# Patient Record
Sex: MALE | Race: WHITE | Employment: OTHER | URBAN - METROPOLITAN AREA
[De-identification: names, ages, dates, MRNs, and addresses within clinical notes are randomized per-mention and may not be internally consistent; named-entity substitution may affect disease eponyms.]

---

## 2019-01-24 ENCOUNTER — OFFICE VISIT (OUTPATIENT)
Dept: ORTHOPEDIC SURGERY | Age: 64
End: 2019-01-24
Payer: MEDICARE

## 2019-01-24 VITALS
HEIGHT: 69 IN | BODY MASS INDEX: 37.03 KG/M2 | HEART RATE: 54 BPM | WEIGHT: 250 LBS | SYSTOLIC BLOOD PRESSURE: 140 MMHG | DIASTOLIC BLOOD PRESSURE: 89 MMHG

## 2019-01-24 DIAGNOSIS — M75.121 COMPLETE TEAR OF RIGHT ROTATOR CUFF: Primary | ICD-10-CM

## 2019-01-24 DIAGNOSIS — M25.511 RIGHT SHOULDER PAIN, UNSPECIFIED CHRONICITY: ICD-10-CM

## 2019-01-24 DIAGNOSIS — M19.011 ARTHRITIS OF RIGHT ACROMIOCLAVICULAR JOINT: ICD-10-CM

## 2019-01-24 PROCEDURE — 99203 OFFICE O/P NEW LOW 30 MIN: CPT | Performed by: ORTHOPAEDIC SURGERY

## 2019-01-24 RX ORDER — PREDNISONE 1 MG/1
5 TABLET ORAL
Refills: 0 | COMMUNITY
Start: 2018-11-01 | End: 2019-02-21

## 2019-01-24 RX ORDER — HYDROCODONE BITARTRATE 30 MG/1
30 TABLET, EXTENDED RELEASE ORAL
COMMUNITY
Start: 2019-01-22

## 2019-01-24 RX ORDER — GABAPENTIN 100 MG/1
CAPSULE ORAL
Refills: 0 | COMMUNITY
Start: 2018-11-28 | End: 2019-08-06

## 2019-01-24 RX ORDER — WARFARIN SODIUM 5 MG/1
TABLET ORAL
COMMUNITY
Start: 2018-08-15 | End: 2022-01-13

## 2019-01-24 RX ORDER — AMLODIPINE BESYLATE 5 MG/1
5 TABLET ORAL
COMMUNITY
Start: 2017-09-21 | End: 2022-01-13

## 2019-01-24 RX ORDER — HYDROCHLOROTHIAZIDE 12.5 MG/1
12.5 CAPSULE, GELATIN COATED ORAL
Refills: 2 | COMMUNITY
Start: 2018-12-21 | End: 2022-01-13

## 2019-01-24 RX ORDER — CITALOPRAM 20 MG/1
20 TABLET ORAL
Refills: 3 | COMMUNITY
Start: 2019-01-05 | End: 2022-01-13

## 2019-01-24 ASSESSMENT — ENCOUNTER SYMPTOMS
SINUS PAIN: 1
SORE THROAT: 1

## 2019-02-21 ENCOUNTER — OFFICE VISIT (OUTPATIENT)
Dept: ORTHOPEDIC SURGERY | Age: 64
End: 2019-02-21
Payer: MEDICARE

## 2019-02-21 VITALS
DIASTOLIC BLOOD PRESSURE: 90 MMHG | WEIGHT: 250 LBS | HEART RATE: 60 BPM | BODY MASS INDEX: 37.03 KG/M2 | HEIGHT: 69 IN | SYSTOLIC BLOOD PRESSURE: 147 MMHG

## 2019-02-21 DIAGNOSIS — M75.121 COMPLETE TEAR OF RIGHT ROTATOR CUFF: ICD-10-CM

## 2019-02-21 DIAGNOSIS — M19.011 ARTHRITIS OF RIGHT ACROMIOCLAVICULAR JOINT: Primary | ICD-10-CM

## 2019-02-21 DIAGNOSIS — M25.511 RIGHT SHOULDER PAIN, UNSPECIFIED CHRONICITY: ICD-10-CM

## 2019-02-21 PROCEDURE — 3017F COLORECTAL CA SCREEN DOC REV: CPT | Performed by: ORTHOPAEDIC SURGERY

## 2019-02-21 PROCEDURE — G8417 CALC BMI ABV UP PARAM F/U: HCPCS | Performed by: ORTHOPAEDIC SURGERY

## 2019-02-21 PROCEDURE — G8484 FLU IMMUNIZE NO ADMIN: HCPCS | Performed by: ORTHOPAEDIC SURGERY

## 2019-02-21 PROCEDURE — G8427 DOCREV CUR MEDS BY ELIG CLIN: HCPCS | Performed by: ORTHOPAEDIC SURGERY

## 2019-02-21 PROCEDURE — 4004F PT TOBACCO SCREEN RCVD TLK: CPT | Performed by: ORTHOPAEDIC SURGERY

## 2019-02-21 PROCEDURE — L3670 SO ACRO/CLAV CAN WEB PRE OTS: HCPCS | Performed by: ORTHOPAEDIC SURGERY

## 2019-02-21 PROCEDURE — 99213 OFFICE O/P EST LOW 20 MIN: CPT | Performed by: ORTHOPAEDIC SURGERY

## 2019-03-01 ENCOUNTER — TELEPHONE (OUTPATIENT)
Dept: ORTHOPEDIC SURGERY | Age: 64
End: 2019-03-01

## 2019-03-07 ENCOUNTER — TELEPHONE (OUTPATIENT)
Dept: ORTHOPEDIC SURGERY | Age: 64
End: 2019-03-07

## 2019-03-07 DIAGNOSIS — Z98.890 S/P SHOULDER SURGERY: Primary | ICD-10-CM

## 2019-03-07 RX ORDER — OXYCODONE HYDROCHLORIDE AND ACETAMINOPHEN 5; 325 MG/1; MG/1
1 TABLET ORAL EVERY 6 HOURS PRN
Qty: 28 TABLET | Refills: 0 | Status: CANCELLED | OUTPATIENT
Start: 2019-03-07 | End: 2019-03-14

## 2019-03-07 RX ORDER — OXYCODONE HYDROCHLORIDE AND ACETAMINOPHEN 5; 325 MG/1; MG/1
1 TABLET ORAL EVERY 6 HOURS PRN
Qty: 28 TABLET | Refills: 0 | Status: SHIPPED | OUTPATIENT
Start: 2019-03-07 | End: 2019-03-14

## 2019-03-14 ENCOUNTER — OFFICE VISIT (OUTPATIENT)
Dept: ORTHOPEDIC SURGERY | Age: 64
End: 2019-03-14

## 2019-03-14 VITALS
DIASTOLIC BLOOD PRESSURE: 78 MMHG | HEIGHT: 69 IN | HEART RATE: 60 BPM | SYSTOLIC BLOOD PRESSURE: 144 MMHG | BODY MASS INDEX: 37.03 KG/M2 | WEIGHT: 250 LBS

## 2019-03-14 DIAGNOSIS — Z98.890 S/P SHOULDER SURGERY: Primary | ICD-10-CM

## 2019-03-14 DIAGNOSIS — M75.121 COMPLETE TEAR OF RIGHT ROTATOR CUFF: ICD-10-CM

## 2019-03-14 DIAGNOSIS — M19.011 ARTHRITIS OF RIGHT ACROMIOCLAVICULAR JOINT: ICD-10-CM

## 2019-03-14 DIAGNOSIS — Z98.890 S/P ROTATOR CUFF REPAIR: ICD-10-CM

## 2019-03-14 PROCEDURE — 99024 POSTOP FOLLOW-UP VISIT: CPT | Performed by: PHYSICIAN ASSISTANT

## 2019-03-21 DIAGNOSIS — Z98.890 S/P SHOULDER SURGERY: Primary | ICD-10-CM

## 2019-03-21 RX ORDER — OXYCODONE HYDROCHLORIDE AND ACETAMINOPHEN 5; 325 MG/1; MG/1
1 TABLET ORAL EVERY 6 HOURS PRN
Qty: 28 TABLET | Refills: 0 | Status: SHIPPED | OUTPATIENT
Start: 2019-03-21 | End: 2019-03-28

## 2019-03-28 ENCOUNTER — OFFICE VISIT (OUTPATIENT)
Dept: ORTHOPEDIC SURGERY | Age: 64
End: 2019-03-28

## 2019-03-28 VITALS
BODY MASS INDEX: 37.03 KG/M2 | HEIGHT: 69 IN | WEIGHT: 250 LBS | HEART RATE: 81 BPM | DIASTOLIC BLOOD PRESSURE: 88 MMHG | SYSTOLIC BLOOD PRESSURE: 125 MMHG

## 2019-03-28 DIAGNOSIS — Z98.890 S/P SHOULDER SURGERY: Primary | ICD-10-CM

## 2019-03-28 DIAGNOSIS — Z98.890 S/P RIGHT ROTATOR CUFF REPAIR: ICD-10-CM

## 2019-03-28 PROCEDURE — 99024 POSTOP FOLLOW-UP VISIT: CPT | Performed by: ORTHOPAEDIC SURGERY

## 2019-03-28 RX ORDER — NALOXONE HYDROCHLORIDE 4 MG/.1ML
1 SPRAY NASAL PRN
Qty: 1 EACH | Refills: 5 | Status: SHIPPED | OUTPATIENT
Start: 2019-03-28

## 2019-03-28 RX ORDER — HYDROCODONE BITARTRATE AND ACETAMINOPHEN 5; 325 MG/1; MG/1
1 TABLET ORAL EVERY 6 HOURS PRN
Qty: 28 TABLET | Refills: 0 | Status: SHIPPED | OUTPATIENT
Start: 2019-03-28 | End: 2019-04-04

## 2019-03-29 ENCOUNTER — EVALUATION (OUTPATIENT)
Dept: PHYSICAL THERAPY | Age: 64
End: 2019-03-29
Payer: MEDICARE

## 2019-03-29 DIAGNOSIS — G89.29 CHRONIC RIGHT SHOULDER PAIN: ICD-10-CM

## 2019-03-29 DIAGNOSIS — M25.511 CHRONIC RIGHT SHOULDER PAIN: ICD-10-CM

## 2019-03-29 DIAGNOSIS — M75.121 COMPLETE TEAR OF RIGHT ROTATOR CUFF: Primary | ICD-10-CM

## 2019-03-29 PROCEDURE — 97161 PT EVAL LOW COMPLEX 20 MIN: CPT | Performed by: PHYSICAL THERAPIST

## 2019-03-29 PROCEDURE — 97110 THERAPEUTIC EXERCISES: CPT | Performed by: PHYSICAL THERAPIST

## 2019-03-29 PROCEDURE — 97530 THERAPEUTIC ACTIVITIES: CPT | Performed by: PHYSICAL THERAPIST

## 2019-04-05 ENCOUNTER — TREATMENT (OUTPATIENT)
Dept: PHYSICAL THERAPY | Age: 64
End: 2019-04-05
Payer: MEDICARE

## 2019-04-05 DIAGNOSIS — G89.29 CHRONIC RIGHT SHOULDER PAIN: ICD-10-CM

## 2019-04-05 DIAGNOSIS — M75.121 COMPLETE TEAR OF RIGHT ROTATOR CUFF: Primary | ICD-10-CM

## 2019-04-05 DIAGNOSIS — M25.511 CHRONIC RIGHT SHOULDER PAIN: ICD-10-CM

## 2019-04-05 PROCEDURE — 97530 THERAPEUTIC ACTIVITIES: CPT | Performed by: PHYSICAL THERAPIST

## 2019-04-05 PROCEDURE — 97110 THERAPEUTIC EXERCISES: CPT | Performed by: PHYSICAL THERAPIST

## 2019-04-05 NOTE — FLOWSHEET NOTE
restriction. 5. Pt will be able to lift R UE for function w/ deviations. (patient specific functional goal)       Progression Towards Functional goals:  [] Patient is progressing as expected towards functional goals listed. [] Progression is slowed due to complexities listed. [] Progression has been slowed due to co-morbidities. [x] Plan just implemented, too soon to assess goals progression  [] Other:     ASSESSMENT:  Pt needs cuing on precautions and restrictions. He agreed to put sling back on when he gets home. Pt is having min to no pain in shoulder. Scapular control fair and needed cuing. Treatment/Activity Tolerance:  [x] Patient tolerated treatment well [] Patient limited by fatique  [] Patient limited by pain  [] Patient limited by other medical complications  [] Other:     Prognosis: [x] Good [] Fair  [] Poor    Patient Requires Follow-up: [x] Yes  [] No    PLAN: Cont to monitor that pt is following protocol. Cont therapy after massive RTC repair.   [x] Continue per plan of care [] Alter current plan (see comments)  [x] Plan of care initiated [] Hold pending MD visit [] Discharge    Electronically signed by: Marisela Velarde PT, ILYA Valdez    Physical Therapist [place] license #[id number]  Physical Therapist [place] license #[id number]

## 2019-04-17 NOTE — PROGRESS NOTES
Review of Systems   Musculoskeletal:        Joint pain         No change since last updated
large rotator cuff tear along with distal clavicle excision and biceps tenodesis. Surgery date was 3/4/2019        Impression:  Encounter Diagnoses   Name Primary?  S/P shoulder surgery Yes    Arthritis of right acromioclavicular joint     Complete tear of right rotator cuff     S/P rotator cuff repair        Office Procedures:  No orders of the defined types were placed in this encounter. Treatment Plan:    Overall the patient is doing well. The pain is well-controlled. We recommend that he wear the UltraSling brace at all times with the exception of clothing, bathing of physical therapy. The patient was told that he is restricted from driving for at least 3 weeks postop. We will give a print out of their physical therapy order. All of [facility] questions were fully answered today. We would like to see im back in 2 weeks for follow-up visit. 12:52 PM      Cary Hartley PA-C  Orthopaedic Sports Medicine Physician Assistant      This dictation was performed with a verbal recognition program [facility]) and it was checked for errors. It is possible that there are still dictated errors within this office note. If so, please bring any errors to my attention for an addendum. All efforts were made to ensure that this office note is accurate.

## 2019-04-30 ENCOUNTER — OFFICE VISIT (OUTPATIENT)
Dept: ORTHOPEDIC SURGERY | Age: 64
End: 2019-04-30

## 2019-04-30 VITALS
HEIGHT: 69 IN | WEIGHT: 250 LBS | BODY MASS INDEX: 37.03 KG/M2 | HEART RATE: 72 BPM | SYSTOLIC BLOOD PRESSURE: 146 MMHG | DIASTOLIC BLOOD PRESSURE: 88 MMHG

## 2019-04-30 DIAGNOSIS — Z98.890 S/P SHOULDER SURGERY: Primary | ICD-10-CM

## 2019-04-30 DIAGNOSIS — Z98.890 S/P RIGHT ROTATOR CUFF REPAIR: ICD-10-CM

## 2019-04-30 PROCEDURE — 99024 POSTOP FOLLOW-UP VISIT: CPT | Performed by: ORTHOPAEDIC SURGERY

## 2019-04-30 NOTE — PROGRESS NOTES
medications, allergies, past medical, surgical, social and family histories were reviewed and updated as appropriate. Allergies: Allergies   Allergen Reactions    Oxycodone Nausea And Vomiting       Physical Exam:  Vitals:    04/30/19 1351   BP: (!) 146/88   Pulse: 72     General: Modesto Escobedo is a healthy and well appearing 71 y.o. male who is sitting comfortably in our office in acute distress. Alert and oriented. General/Appearance: Alert and oriented and in no apparent distress. Skin:  There are no skin lesions, cellulitis, or extreme edema. The patient has warm and well-perfused Bilateral upper extremities with brisk capillary refill. Right Shoulder Exam:  Inspection: Right shoulder incisions are clean, dry, and well-healing. No edema, erythema or ecchymosis. No gross deformities, no signs of infection. Palpation: Mild tenderness to palpation over rotator cuff footprint. No pain over posterior osteophyte. No crepitus. Active Range of Motion: Forward elevation of 150, abduction of 130, external rotation with elbow at the side 40, internal rotation to the back is to back pocket    Passive Range of Motion: Passively forward elevation can be further increased to 170. Strength: Deferred. Special Tests: Deferred. Neurovascular: Sensation to light touch is intact, no motor deficits, palpable radial pulses 2+    Neuro: alert. oriented  Eyes: Extra-ocular muscles intact  Mouth: Oral mucosa moist. No perioral lesions  Pulm: Respirations unlabored and regular. Skin: warm, well perfused    Additional Examinations:    Examination of the contralateral extremity does not show any tenderness, deformity or injury. Range of motion is unremarkable. There is no gross instability. There are no rashes, ulcerations or lesions. Strength and tone are normal.      Radiographic:  None performed.     Assessment:  Modesto Escobedo is a 71y.o. year old male with right shoulder pain related to right shoulder arthroscopy debridement decompression, massive rotator cuff repair, lysis of adhesions, and distal clavicle excision performed 8 weeks ago on 3/4/19. Impression:  Encounter Diagnoses   Name Primary?  S/P shoulder surgery Yes    S/P right rotator cuff repair        Office Procedures:  Orders Placed This Encounter   Procedures    Ambulatory referral to Physical Therapy     Referral Priority:   Routine     Referral Type:   Eval and Treat     Referral Reason:   Specialty Services Required     Requested Specialty:   Physical Therapy     Number of Visits Requested:   1       Plan:   Mr. Sherlyn Nino is now 8 weeks s/p  right shoulder arthroscopy debridement decompression, massive rotator cuff repair, lysis of adhesions, and distal clavicle excision. He is doing well overall. He was told to discontinue use of his UltraSling. A physical therapy order was written ) I was Wilmar García to the patient today. We will have him start some light strengthening with biceps, triceps and rowing exercises. We will have him continue to work on his range of motion along with external rotation and internal rotation. Details of the therapy was placed in his epic chart. All questions were answered and the patient was agreeable to this plan. He was told to follow up with us in the office again in 3 weeks. 4/30/2019  3:55 PM      Roberth Cavazos PA-C  Orthopaedic Sports Medicine Physician Assistant    During this examination, IRoberth PA-C, functioned as a scribe for Dr. Ariadna Mercado. This dictation was performed with a verbal recognition program ([employer]) and it was checked for errors. It is possible that there are still dictated errors within this office note. If so, please bring any errors to my attention for an addendum.   All efforts were made to ensure that this office note is accurate.  _________________  I, Dr. Ariadna Mercado, personally performed the services described in this documentation as described by [facility] Celsa Templeton PA-C in my presence, and it is both accurate and complete. Murtaza Hutchison MD, PhD  4/30/2019

## 2019-04-30 NOTE — LETTER
Physical Therapy Rehabilitation Referral    Patient Name:  Vy Grant      YOB: 1955    Diagnosis:    1. S/P shoulder surgery    2. S/P right rotator cuff repair        Precautions:     [x] Evaluate and Treat    Post Op Instructions:  [] Continuous passive motion (CPM) [x] Elbow ROM  [x] Exercise in plane of scapula  []  Strengthening     [] Pulley and instruction   [x] Home exercise program (copy to patient)   [] Sling when arm at risk  [] Sling or brace at all times   [x] AAROM: Forward elevation to  140            [x] AAROM: External rotation  To  40    [] Isometric external rotator strengthening [x] AAROM: internal rotation: up the back  [x] Isometric abductor strengthening  [x] AAROM: Internal abduction   [] Isometric internal rotator strengthening [] AAROM: cross-body adduction             Stretching:     Strengthening:  [] Four quadrant (FE, ER, IR, CBA)  [] Rotator cuff (ER, IR, Abd)  [x] Forward Elevation    [] External Rotators     [x] External Rotation    [] Internal Rotators  [x] Internal Rotation: up/back   [] Abductors     [x] Internal Rotation: supine in abduction  [] Sleeper Stretch    [] Flexors  [] Cross-body abduction    [] Extensors  [x] Pendulum (FE, Abd/Add, cw/ccw)  [x] Scapular Stabilizers   [x] Wall-walking (FE, Abd)        [x] Shoulder shrugs     [x] Table slides (FE)                [x] Rhomboid pinch  [] Elbow (flex, ext, pron, sup)        [] Lat.  Pull downs     [] Medial epicondylitis program       [] Forward punch   [] Lateral epicondylitis program       [] Internal rotators     [] Progressive resistive exercises  [] Bench Press        [] Bench press plus  Activities:     [] Lateral pull-downs  [] Rowing     [] Progressive two-hand supine press  [] Stepper/Exercise bike   [x] Biceps: curls/supination  [] Swimming  [] Water exercises    Modalities:     Return to Sport:  [x] Of Choice      [] Plyometrics  [] Ultrasound     [] Rhythmic stabilization

## 2019-05-03 ENCOUNTER — TREATMENT (OUTPATIENT)
Dept: PHYSICAL THERAPY | Age: 64
End: 2019-05-03
Payer: MEDICARE

## 2019-05-03 DIAGNOSIS — G89.29 CHRONIC RIGHT SHOULDER PAIN: ICD-10-CM

## 2019-05-03 DIAGNOSIS — M75.121 COMPLETE TEAR OF RIGHT ROTATOR CUFF: Primary | ICD-10-CM

## 2019-05-03 DIAGNOSIS — M25.511 CHRONIC RIGHT SHOULDER PAIN: ICD-10-CM

## 2019-05-03 PROCEDURE — 97530 THERAPEUTIC ACTIVITIES: CPT | Performed by: PHYSICAL THERAPIST

## 2019-05-03 PROCEDURE — 97110 THERAPEUTIC EXERCISES: CPT | Performed by: PHYSICAL THERAPIST

## 2019-05-03 NOTE — PLAN OF CARE
[facility]   Phone: [phone]    Fax: [phone]        Physical Therapy Daily Treatment Note/POC   Physical Therapy Re-Certification Plan of Care    Dear  Dr. Lord Herring,    We had the pleasure of treating the following patient for physical therapy services at [address] [place] [address]. A summary of our findings can be found in the updated assessment below. This includes our plan of care. If you have any questions or concerns regarding these findings, please do not hesitate to contact me at the office phone number checked above. Thank you for the referral.     Physician Signature:________________________________Date:__________________  By signing above (or electronic signature), therapists plan is approved by physician      Overall Response to Treatment:   []Patient is responding well to treatment and improvement is noted with regards  to goals   []Patient should continue to improve in reasonable time if they continue HEP   []Patient has plateaued and is no longer responding to skilled PT intervention    []Patient is getting worse and would benefit from return to referring MD   []Patient unable to adhere to initial POC   []Other: Pt has not been coming to therapy. He has been to therapy for eval and 1 session. Pt has been using his R shoulder a lot at home but trying not to do so.  Pain is min in shoulder and sleeping    Date:  5/3/2019    Patient Name:  Vin Wilcox    :  1955  MRN: [id number]  Medical/Treatment Diagnosis Information:  · Diagnosis: R massive RTC, SAD, Clementine on 51     Insurance/Certification information:  PT Insurance Information: [employer] $10 copayment; med nec  Physician Information:  Referring Practitioner: Nitza Easley of care signed (Y/N): signed 3/29/19,    Date of Patient follow up with Physician:     G-Code (if applicable):      Date G-Code Applied: 3/29/19    PT G-Codes  Functional Assessment Tool Used: UEFI  Score: 1080=8%  Functional Limitation: Carrying, moving and handling objects    Progress Note: [x]  Yes  []  No  Next due by: 6/3/19      Latex Allergy:  [x]NO      []YES  Preferred Language for Healthcare:   [x]English       []other:    Visit # Insurance Allowable   3 Med nec     Pain level:  9/10 worst in last week for R shoulder; 4/10 constant in shoulder;     SUBJECTIVE: Pt has min pain in shoulder in ant shoulder region. He has been sleeping w/ min to no pain in shoulder. He did swing a piece of wood at dog that had bitten his grand kid w/ pain afterwards. Icing sporadically;    OBJECTIVE: See eval  Observation: sling d/c;  Test measurements:      RESTRICTIONS/PRECAUTIONS: stroke; Exercises/Interventions:   Exercises:  Exercise/Equipment Resistance/Repetitions Comments   Cardio/Warm-up     Bike          Stretching/PROM     Wand ER @0 10\"x10    Table Slides flex and scaption 10\"x10    UE [place]     Pulleys     Pendulum      IR using towel to back 10\"x10         STRENGTH     Isometrics     Retraction          Weight shift     Flexion     Abduction 10\"x10 Muscle activation   External Rotation     Internal Rotation     Biceps 0# x30    Triceps          PRE's     Flexion     Abduction     External Rotation     Internal Rotation     Shrugs 2# x20 cuing   EXT     Reverse Flys     Serratus     Horizontal Abd with ER     Biceps     Triceps OTB x30    Retraction 9 :00 position in sitting x20 cuing   Wrist flex and ext          Cable Column/Theraband     External Rotation     Internal Rotation     Shrugs     Lats     Ext     Flex     Scapular Retraction     BIC     TRIC     PNF          Neuromuscular Re-ed     Dynamic Stability          Fitting sling     Ed on POC, expectation & precautions x10 min, 5/3/19 reviewed and ed pt on healing process & precautions NEEDS REVIEWING             Plyoback                    Manual/Modalities                     PQRS:   Reviewed medication list with patient. No changes since last PT visit. reaching, carrying, lifting, house/yardwork, driving/computer work    Modalities:  declined    Charges:  Timed Code Treatment Minutes: 30   Total Treatment Minutes: 60     [] EVAL (LOW) 77171 (typically 20 minutes face-to-face)  [] EVAL (MOD) 81112 (typically 30 minutes face-to-face)  [] EVAL (HIGH) 98170 (typically 45 minutes face-to-face)  [] RE-EVAL   [x] AS(11725) x  2   [] IONTO  [] NMR (77072) x      [] VASO  [] Manual (81737) x       [] Other:  [x] TA (00874) x  1    [] Mech Traction (95997)  [] ES(attended) (10518)      [] ES (un) (39952):     GOALS:  Patient stated goal: full motion of R shoulder    Therapist goals for Patient:   Short Term Goals: To be achieved in: 2 weeks  1. Independent in HEP and progression per patient tolerance, in order to prevent re-injury. 2. Patient will have a decrease in pain to facilitate improvement in movement, function, and ADLs as indicated by Functional Deficits. Long Term Goals: To be achieved in: 12 weeks  1. Disability index score of 10% or less for the DASH to assist with reaching prior level of function. 2. Patient will demonstrate increased AROM to 165 elevation,  head for ER, & waist for IR to allow for proper joint functioning as indicated by patients Functional Deficits. 3. Patient will demonstrate an increase in Strength to 4+/5 to allow for proper functional mobility as indicated by patients Functional Deficits. 4. Patient will return to all functional activities without increased symptoms or restriction. 5. Pt will be able to lift R UE for function w/ deviations. (patient specific functional goal)       Progression Towards Functional goals:  [] Patient is progressing as expected towards functional goals listed. [] Progression is slowed due to complexities listed. [] Progression has been slowed due to co-morbidities.   [x] Plan just implemented, too soon to assess goals progression  [] Other:     ASSESSMENT:  Pt needs cuing on precautions and restrictions. Pt is doing too much at home so reviewed precautions and healing time. Motion in shoulder is really good and min pain in shoulder. Advanced HEP to incorporate stretching into HEP      Treatment/Activity Tolerance:  [x] Patient tolerated treatment well [] Patient limited by fatique  [] Patient limited by pain  [] Patient limited by other medical complications  [] Other:     Prognosis: [x] Good [] Fair  [] Poor    Patient Requires Follow-up: [x] Yes  [] No    PLAN: Cont to monitor that pt is following protocol. Cont therapy after massive RTC repair.   [x] Continue per plan of care [] Alter current plan (see comments)  [x] Plan of care initiated [] Hold pending MD visit [] Discharge    Electronically signed by: Zenon Neri PT, Susan Sanford 86, OMT-C    Physical Therapist [place] license #[id number]  Physical Therapist [place] license #[id number]

## 2019-05-10 ENCOUNTER — TREATMENT (OUTPATIENT)
Dept: PHYSICAL THERAPY | Age: 64
End: 2019-05-10
Payer: MEDICARE

## 2019-05-10 DIAGNOSIS — M75.121 COMPLETE TEAR OF RIGHT ROTATOR CUFF: Primary | ICD-10-CM

## 2019-05-10 DIAGNOSIS — G89.29 CHRONIC RIGHT SHOULDER PAIN: ICD-10-CM

## 2019-05-10 DIAGNOSIS — M25.511 CHRONIC RIGHT SHOULDER PAIN: ICD-10-CM

## 2019-05-10 PROCEDURE — 97530 THERAPEUTIC ACTIVITIES: CPT | Performed by: PHYSICAL THERAPIST

## 2019-05-10 PROCEDURE — 97110 THERAPEUTIC EXERCISES: CPT | Performed by: PHYSICAL THERAPIST

## 2019-05-10 NOTE — FLOWSHEET NOTE
Internal Rotation     Biceps 1# x30    Triceps          PRE's     Flexion     Abduction     External Rotation     Internal Rotation     Shrugs 2# x20 cuing   EXT     Reverse Flys     Serratus     Horizontal Abd with ER     Biceps     Triceps OTB x30    Retraction  cuing   Wrist flex and ext          Cable Column/Theraband     External Rotation     Internal Rotation     Shrugs     Lats     Ext     Flex     Scapular Retraction YTB 3x10    BIC     TRIC     PNF          Neuromuscular Re-ed     Dynamic Stability          Fitting sling     Ed on POC, expectation & precautions x10 min, 5/10/19 reviewed and ed pt on healing process & precautions NEEDS REVIEWING             Plyoback                    Manual/Modalities                     PQRS:   Reviewed medication list with patient. No changes since last PT visit. Therapeutic Exercise and NMR EXR  [] (75370) Provided verbal/tactile cueing for activities related to strengthening, flexibility, endurance, ROM  for improvements in scapular, scapulothoracic and UE control with self care, reaching, carrying, lifting, house/yardwork, driving/computer work.    [] (44892) Provided verbal/tactile cueing for activities related to improving balance, coordination, kinesthetic sense, posture, motor skill, proprioception  to assist with  scapular, scapulothoracic and UE control with self care, reaching, carrying, lifting, house/yardwork, driving/computer work. Therapeutic Activities:    [] (54950 or 64148) Provided verbal/tactile cueing for activities related to improving balance, coordination, kinesthetic sense, posture, motor skill, proprioception and motor activation to allow for proper function of scapular, scapulothoracic and UE control with self care, carrying, lifting, driving/computer work.      Home Exercise Program:    [x] (06293) Reviewed/Progressed HEP activities related to strengthening, flexibility, endurance, ROM of scapular, scapulothoracic and UE control with self care, reaching, carrying, lifting, house/yardwork, driving/computer work 5/3/19 reviewed HEP & OTB given  [] (92371) Reviewed/Progressed HEP activities related to improving balance, coordination, kinesthetic sense, posture, motor skill, proprioception of scapular, scapulothoracic and UE control with self care, reaching, carrying, lifting, house/yardwork, driving/computer work      Manual Treatments:  PROM / STM / Oscillations-Mobs:  G-I, II, III, IV (PA's, Inf., Post.)  [] (71756) Provided manual therapy to mobilize soft tissue/joints of cervical/CT, scapular GHJ and UE for the purpose of modulating pain, promoting relaxation,  increasing ROM, reducing/eliminating soft tissue swelling/inflammation/restriction, improving soft tissue extensibility and allowing for proper ROM for normal function with self care, reaching, carrying, lifting, house/yardwork, driving/computer work    Modalities:  CP x10 min    Charges:  Timed Code Treatment Minutes: 30   Total Treatment Minutes: 60     [] EVAL (LOW) 09586 (typically 20 minutes face-to-face)  [] EVAL (MOD) 06906 (typically 30 minutes face-to-face)  [] EVAL (HIGH) 50297 (typically 45 minutes face-to-face)  [] RE-EVAL   [x] QM(09739) x  2   [] IONTO  [] NMR (40341) x      [] VASO  [] Manual (13583) x       [] Other:  [x] TA (87993) x  1    [] Mech Traction (93970)  [] ES(attended) (16187)      [] ES (un) (90351):     GOALS:  Patient stated goal: full motion of R shoulder    Therapist goals for Patient:   Short Term Goals: To be achieved in: 2 weeks  1. Independent in HEP and progression per patient tolerance, in order to prevent re-injury. 2. Patient will have a decrease in pain to facilitate improvement in movement, function, and ADLs as indicated by Functional Deficits. Long Term Goals: To be achieved in: 12 weeks  1. Disability index score of 10% or less for the DASH to assist with reaching prior level of function.    2. Patient will demonstrate increased AROM to 165 elevation,  head for ER, & waist for IR to allow for proper joint functioning as indicated by patients Functional Deficits. Progressing  3. Patient will demonstrate an increase in Strength to 4+/5 to allow for proper functional mobility as indicated by patients Functional Deficits. 4. Patient will return to all functional activities without increased symptoms or restriction. 5. Pt will be able to lift R UE for function w/ deviations. (patient specific functional goal)       Progression Towards Functional goals:  [] Patient is progressing as expected towards functional goals listed. [] Progression is slowed due to complexities listed. [] Progression has been slowed due to co-morbidities. [x] Plan just implemented, too soon to assess goals progression  [] Other:     ASSESSMENT:  Pt had more pain in shoulder today. Did not add a lot of exercises due to symptoms. Reviewed precautions. Motion in shoulder moving well for just starting activities. Good tech w/ exercises. Treatment/Activity Tolerance:  [x] Patient tolerated treatment well [] Patient limited by fatique  [] Patient limited by pain  [] Patient limited by other medical complications  [] Other:     Prognosis: [x] Good [] Fair  [] Poor    Patient Requires Follow-up: [x] Yes  [] No    PLAN: Cont to monitor that pt is following protocol. Cont therapy after massive RTC repair.   [x] Continue per plan of care [] Alter current plan (see comments)  [x] Plan of care initiated [] Hold pending MD visit [] Discharge    Electronically signed by: Michaelyn Nissen PT, Susan Sanford 86, OMT-C    Physical Therapist [place] license #[id number]  Physical Therapist [place] license #[id number]

## 2019-05-15 ENCOUNTER — TREATMENT (OUTPATIENT)
Dept: PHYSICAL THERAPY | Age: 64
End: 2019-05-15

## 2019-05-15 DIAGNOSIS — G89.29 CHRONIC RIGHT SHOULDER PAIN: ICD-10-CM

## 2019-05-15 DIAGNOSIS — M25.511 CHRONIC RIGHT SHOULDER PAIN: ICD-10-CM

## 2019-05-15 DIAGNOSIS — M75.121 COMPLETE TEAR OF RIGHT ROTATOR CUFF: Primary | ICD-10-CM

## 2019-05-15 NOTE — FLOWSHEET NOTE
[facility]   Phone: [phone]    Fax: [phone]            Physical Therapy  Cancellation/No-show Note  Patient Name:  Heather Hoffman  :  1955   Date:  5/15/2019  Cancelled visits to date: 1  No-shows to date: 1    For today's appointment patient:  [x]  Cancelled  []  Rescheduled appointment  []  No-show     Reason given by patient:  []  Patient ill  [x]  Conflicting appointment  []  No transportation    []  Conflict with work  []  No reason given  []  Other:     Comments:Pt does not have any other appts scheduled at this time. [facility] desk staff was going to call pt to reschedule appt.      Electronically signed by:  Michael Lira PT, Susan Sanford 86, OMT-C    Physical Therapist [address] [place] [address] license #[id number]  Physical Therapist [place] license #[id number]

## 2019-05-22 ENCOUNTER — TREATMENT (OUTPATIENT)
Dept: PHYSICAL THERAPY | Age: 64
End: 2019-05-22
Payer: MEDICARE

## 2019-05-22 DIAGNOSIS — G89.29 CHRONIC RIGHT SHOULDER PAIN: ICD-10-CM

## 2019-05-22 DIAGNOSIS — M75.121 COMPLETE TEAR OF RIGHT ROTATOR CUFF: Primary | ICD-10-CM

## 2019-05-22 DIAGNOSIS — M25.511 CHRONIC RIGHT SHOULDER PAIN: ICD-10-CM

## 2019-05-22 PROCEDURE — 97110 THERAPEUTIC EXERCISES: CPT | Performed by: PHYSICAL THERAPIST

## 2019-05-22 PROCEDURE — 97112 NEUROMUSCULAR REEDUCATION: CPT | Performed by: PHYSICAL THERAPIST

## 2019-05-22 PROCEDURE — 97530 THERAPEUTIC ACTIVITIES: CPT | Performed by: PHYSICAL THERAPIST

## 2019-05-22 NOTE — FLOWSHEET NOTE
[facility]   Phone: [phone]    Fax: [phone]        Physical Therapy Daily Treatment Note       Date:  2019    Patient Name:  Demetrius Kessler    :  1955  MRN: [id number]  Medical/Treatment Diagnosis Information:  · Diagnosis: R massive RTC, SAD, Clementine on 99     Insurance/Certification information:  PT Insurance Information: [employer] $10 copayment; med nec  Physician Information:  Referring Practitioner: Magali Elaine of care signed (Y/N): signed 3/29/19,    Date of Patient follow up with Physician:     G-Code (if applicable):      Date G-Code Applied: 3/29/19    PT G-Codes  Functional Assessment Tool Used: UEFI  Score: 10/80=8%  Functional Limitation: Carrying, moving and handling objects    Progress Note: [x]  Yes  []  No  Next due by: 6/3/19      Latex Allergy:  [x]NO      []YES  Preferred Language for Healthcare:   [x]English       []other:    Visit # Insurance Allowable   5 Med nec     Pain level:  9/10 worst in last week for R shoulder; 5/10 constant in shoulder;     SUBJECTIVE: Pt states that he is using his shoulder more but trying to follow restrictions. Pt used chain saw at home for cutting trees this past week but tried to keep the R shoulder by his side.      11 weeks s/p surg    OBJECTIVE: See eval  Observation: sling d/c;  Test measurements:      ROM PROM AROM  Comment    L R L R 19   Flexion  140 w/ table slides      Abduction  140 w/ table slides      ER        IR        Other        Other             RESTRICTIONS/PRECAUTIONS: stroke;  19 flex and abd 140, ER 40, IR up back, strengthening bicep, triceps and back    Exercises/Interventions:   Exercises:  Exercise/Equipment Resistance/Repetitions Comments   Cardio/Warm-up     Bike          Stretching/PROM     Wand ER @0 10\"x10    Table Slides flex and scaption 10\"x10    UE [place]     Pulleys     Pendulum      IR using towel to back 10\"x10         STRENGTH     Isometrics     Retraction      Weight shift     Flexion     Abduction 10\"x10 Muscle activation   External Rotation     Internal Rotation     Biceps 1# x30    Triceps          PRE's     Flexion W/ cane x20    Abduction     External Rotation     Internal Rotation     Shrugs 2# x20    EXT     Reverse Flys     Serratus/punches 0# x20    Horizontal Abd with ER     Biceps     Triceps OTB x30    Retraction     Wrist flex and ext          Cable Column/Theraband     External Rotation     Internal Rotation     Shrugs     Lats     Ext PTB 3x10    Flex     Scapular Retraction PTB 3x10    BIC     TRIC     PNF          Neuromuscular Re-ed     Dynamic Stability          Fitting sling     Ed on POC, expectation & precautions x10 min, 5/22/19 reviewed and ed pt on healing process & precautions NEEDS REVIEWING             [place]                    Manual/Modalities                     PQRS:   Reviewed medication list with patient. No changes since last PT visit. Therapeutic Exercise and NMR EXR  [] (93359) Provided verbal/tactile cueing for activities related to strengthening, flexibility, endurance, ROM  for improvements in scapular, scapulothoracic and UE control with self care, reaching, carrying, lifting, house/yardwork, driving/computer work.    [] (27204) Provided verbal/tactile cueing for activities related to improving balance, coordination, kinesthetic sense, posture, motor skill, proprioception  to assist with  scapular, scapulothoracic and UE control with self care, reaching, carrying, lifting, house/yardwork, driving/computer work. Therapeutic Activities:    [] (53461 or 72465) Provided verbal/tactile cueing for activities related to improving balance, coordination, kinesthetic sense, posture, motor skill, proprioception and motor activation to allow for proper function of scapular, scapulothoracic and UE control with self care, carrying, lifting, driving/computer work.      Home Exercise Program:    [x] (47158) Reviewed/Progressed HEP Disability index score of 10% or less for the DASH to assist with reaching prior level of function. 2. Patient will demonstrate increased AROM to 165 elevation,  head for ER, & waist for IR to allow for proper joint functioning as indicated by patients Functional Deficits. Progressing  3. Patient will demonstrate an increase in Strength to 4+/5 to allow for proper functional mobility as indicated by patients Functional Deficits. 4. Patient will return to all functional activities without increased symptoms or restriction. 5. Pt will be able to lift R UE for function w/ deviations. (patient specific functional goal)       Progression Towards Functional goals:  [] Patient is progressing as expected towards functional goals listed. [] Progression is slowed due to complexities listed. [] Progression has been slowed due to co-morbidities. [x] Plan just implemented, too soon to assess goals progression  [] Other:     ASSESSMENT:  Pt had min pain w/ activities. Motion in shoulder good w/ min restrictions at end range. Strength in shoulder very limited w/ some pain in ant shoulder. Reviewed precautions of lifting and activities at home. Instructed pt not to use pain as his guide for activities. Treatment/Activity Tolerance:  [x] Patient tolerated treatment well [] Patient limited by fatique  [] Patient limited by pain  [] Patient limited by other medical complications  [] Other:     Prognosis: [x] Good [] Fair  [] Poor    Patient Requires Follow-up: [x] Yes  [] No    PLAN: Cont to monitor that pt is following protocol. Cont therapy after massive RTC repair.   [x] Continue per plan of care [] Alter current plan (see comments)  [x] Plan of care initiated [] Hold pending MD visit [] Discharge    Electronically signed by: Dina Saba PT, Susan Sanford 86, OMT-C    Physical Therapist [facility] license #[id number]  Physical Therapist [place] license #[id number]

## 2019-05-23 ENCOUNTER — OFFICE VISIT (OUTPATIENT)
Dept: ORTHOPEDIC SURGERY | Age: 64
End: 2019-05-23

## 2019-05-23 VITALS
BODY MASS INDEX: 37.03 KG/M2 | WEIGHT: 250 LBS | SYSTOLIC BLOOD PRESSURE: 129 MMHG | DIASTOLIC BLOOD PRESSURE: 82 MMHG | HEART RATE: 62 BPM | HEIGHT: 69 IN

## 2019-05-23 DIAGNOSIS — M19.011 ARTHRITIS OF RIGHT ACROMIOCLAVICULAR JOINT: ICD-10-CM

## 2019-05-23 DIAGNOSIS — Z98.890 S/P SHOULDER SURGERY: Primary | ICD-10-CM

## 2019-05-23 DIAGNOSIS — Z98.890 S/P RIGHT ROTATOR CUFF REPAIR: ICD-10-CM

## 2019-05-23 PROCEDURE — 99024 POSTOP FOLLOW-UP VISIT: CPT | Performed by: PHYSICIAN ASSISTANT

## 2019-05-23 NOTE — PROGRESS NOTES
[facility]  History and Physical  Shoulder Pain    Date:  2019    Name:  Latrell Flood  Address:  [address] [address]    :  1955      Age:   71 y.o.    SSN:  [id number]      Medical Record Number:  [id number]    Reason for Visit:    Shoulder Pain (F/u right shoulder)      HPI:   Latrell Flood is a 71 y.o. male who presents to our office today complaining of  right shoulder pain. The patient underwent a right shoulder arthroscopy with extensive to debridement and decompression, massive rotator cuff repair along with distal clavicle excision on 3/4/19. He reports no pain at rest or at sleep. He is still working on keeping the smoking down. He is using 6-7 cigarettes per day. He is going to physical therapy at the [place] office. He reports he used the chain saw last week and it ached a little but went away after a day. He denies any other injuries. He is off of his pain medicine. Pain Assessment  Location of Pain: Shoulder  Location Modifiers: Right  Severity of Pain: 5  Quality of Pain: Sharp, Aching, Dull  Duration of Pain: Persistent  Frequency of Pain: Constant  Limiting Behavior: Some  Relieving Factors: Ice, Exercise  Result of Injury: No  Work-Related Injury: No  Are there other pain locations you wish to document?: No    Patient has had no medical changes since last evaluated      Review of Systems:  A 14 point review of systems available in the scanned medical record as documented by the patient on 19. The review is negative with the exception of those things mentioned in the History of Present Illness and Past Medical History. Past Medical History:  Patient's medications, allergies, past medical, surgical, social and family histories were reviewed and updated as appropriate. Allergies:   Allergies   Allergen Reactions    Oxycodone Nausea And Vomiting       Physical Exam:  Vitals:    19 0958   BP: Procedures:  No orders of the defined types were placed in this encounter. Plan:   The patient can start the strength portion of his rehab. Updated therapy orders are placed in the chart. He was asked to be careful with doing increased activities around the house. He was advised not to use a chain saw. All his questions were full answered. In addition, the patient was counseled with regard to the benefits of not smoking. Specifically, the adverse consequences that tobacco use has musculoskeletal healing were addressed. The patient was offered referral for resources to assist with these efforts. 5/23/2019  10:32 AM      Neal Botello PA-C  Orthopaedic Sports Medicine Physician Assistant      This dictation was performed with a verbal recognition program [facility]) and it was checked for errors. It is possible that there are still dictated errors within this office note. If so, please bring any errors to my attention for an addendum. All efforts were made to ensure that this office note is accurate.

## 2019-05-23 NOTE — LETTER
Physical Therapy Rehabilitation Referral    Patient Name:  Manuela Regalado      YOB: 1955    Diagnosis:    1. S/P shoulder surgery    2. S/P right rotator cuff repair    3. Arthritis of right acromioclavicular joint        Precautions:     [x] Evaluate and Treat    Post Op Instructions:  [] Continuous passive motion (CPM) [] Elbow ROM  [x] Exercise in plane of scapula  []  Strengthening     [] Pulley and instruction   [x] Home exercise program (copy to patient)   [] Sling when arm at risk  [] Sling or brace at all times   [x] AAROM: Forward elevation to  140            [x] AAROM: External rotation  To  40    [] Isometric external rotator strengthening [x] AAROM: internal rotation: up the back  [x] Isometric abductor strengthening  [x] AAROM: Internal abduction   [] Isometric internal rotator strengthening [x] AAROM: cross-body adduction             Stretching:     Strengthening:  [x] Four quadrant (FE, ER, IR, CBA)  [x] Rotator cuff (ER, IR, Abd)  [x] Forward Elevation    [] External Rotators     [x] External Rotation    [] Internal Rotators  [x] Internal Rotation: up/back   [] Abductors     [x] Internal Rotation: supine in abduction  [x] Sleeper Stretch    [] Flexors  [x] Cross-body abduction    [] Extensors  [x] Pendulum (FE, Abd/Add, cw/ccw)  [x] Scapular Stabilizers   [x] Wall-walking (FE, Abd)        [x] Shoulder shrugs     [x] Table slides (FE)                [x] Rhomboid pinch  [] Elbow (flex, ext, pron, sup)        [] Lat.  Pull downs     [] Medial epicondylitis program       [] Forward punch   [] Lateral epicondylitis program       [] Internal rotators     [] Progressive resistive exercises  [] Bench Press        [] Bench press plus  Activities:     [] Lateral pull-downs  [] Rowing     [x] Progressive two-hand supine press  [] Stepper/Exercise bike   [x] Biceps: curls/supination  [] Swimming  [] Water exercises    Modalities:     Return to Sport:  [x] Of Choice      [] Plyometrics [] Ultrasound     [] Rhythmic stabilization  [] Iontophoresis    [] Core strengthening   [] Moist heat     [] Sports specific program:   [] Massage         [x] Cryotherapy      [] Electrical stimulation     [] Paraffin  [] Whirlpool  [] TENS    [x] Home exercise program (copy to patient).         Perform exercises for:   15     minutes    3      times/day  [x] Supervised physical therapy  Frequency: []  1x week  [x] 2x week  [] 3x week  [] Other:   Duration: [] 2 weeks   [] 4 weeks  [x] 6 weeks  [] Other:     Additional Instructions:              Roberth Cavazos PA-C

## 2019-05-31 ENCOUNTER — TREATMENT (OUTPATIENT)
Dept: PHYSICAL THERAPY | Age: 64
End: 2019-05-31
Payer: MEDICARE

## 2019-05-31 DIAGNOSIS — M25.511 CHRONIC RIGHT SHOULDER PAIN: ICD-10-CM

## 2019-05-31 DIAGNOSIS — M75.121 COMPLETE TEAR OF RIGHT ROTATOR CUFF: Primary | ICD-10-CM

## 2019-05-31 DIAGNOSIS — G89.29 CHRONIC RIGHT SHOULDER PAIN: ICD-10-CM

## 2019-05-31 PROCEDURE — 97530 THERAPEUTIC ACTIVITIES: CPT | Performed by: PHYSICAL THERAPIST

## 2019-05-31 PROCEDURE — 97110 THERAPEUTIC EXERCISES: CPT | Performed by: PHYSICAL THERAPIST

## 2019-05-31 PROCEDURE — 97112 NEUROMUSCULAR REEDUCATION: CPT | Performed by: PHYSICAL THERAPIST

## 2019-05-31 NOTE — FLOWSHEET NOTE
[facility]   Phone: [phone]    Fax: [phone]        Physical Therapy Daily Treatment Note       Date:  2019    Patient Name:  Terri Rich    :  1955  MRN: [id number]  Medical/Treatment Diagnosis Information:  · Diagnosis: R massive RTC, SAD, Clementine on 89     Insurance/Certification information:  PT Insurance Information: [employer] $10 copayment; med nec  Physician Information:  Referring Practitioner: Genesis Cleaning of care signed (Y/N): signed 3/29/19,    Date of Patient follow up with Physician:     G-Code (if applicable):      Date G-Code Applied: 3/29/19    PT G-Codes  Functional Assessment Tool Used: UEFI  Score: 10/80=8%  Functional Limitation: Carrying, moving and handling objects    Progress Note: [x]  Yes  []  No  Next due by: 6/3/19      Latex Allergy:  [x]NO      []YES  Preferred Language for Healthcare:   [x]English       []other:    Visit # Insurance Allowable   6 Med nec     Pain level:  9/10 worst in last week for R shoulder; 5/10 constant in shoulder;     SUBJECTIVE: Pt states that he is using his shoulder more but trying to follow restrictions. He did lift a bag of concrete but \"used his L shoulder primarily\".      12+ weeks s/p surg    OBJECTIVE: See eval  Observation:   Test measurements:      ROM PROM AROM  Comment    L R L R 19   Flexion  140 w/ table slides      Abduction  140 w/ table slides      ER        IR        Other        Other             RESTRICTIONS/PRECAUTIONS: stroke;  19 flex and abd 140, ER 40, IR up back, strengthening bicep, triceps and back    Exercises/Interventions:   Exercises:  Exercise/Equipment Resistance/Repetitions Comments   Cardio/Warm-up     Bike          Stretching/PROM     Wand ER @0 & 90 10\"x10    Table Slides flex and scaption 10\"x10    UE [place]     Pulleys x4 min    Pendulum      IR using towel to back 10\"x10         STRENGTH     Isometrics     Retraction          Weight shift     Flexion     Abduction flexibility, endurance, ROM of scapular, scapulothoracic and UE control with self care, reaching, carrying, lifting, house/yardwork, driving/computer work 5/22/19 reviewed HEP & activities at home of not lifting;  [] (96195) Reviewed/Progressed HEP activities related to improving balance, coordination, kinesthetic sense, posture, motor skill, proprioception of scapular, scapulothoracic and UE control with self care, reaching, carrying, lifting, house/yardwork, driving/computer work      Manual Treatments:  PROM / STM / Oscillations-Mobs:  G-I, II, III, IV (PA's, Inf., Post.)  [] ([phone]) Provided manual therapy to mobilize soft tissue/joints of cervical/CT, scapular GHJ and UE for the purpose of modulating pain, promoting relaxation,  increasing ROM, reducing/eliminating soft tissue swelling/inflammation/restriction, improving soft tissue extensibility and allowing for proper ROM for normal function with self care, reaching, carrying, lifting, house/yardwork, driving/computer work    Modalities:  CP x10 min    Charges:  Timed Code Treatment Minutes: 40   Total Treatment Minutes: 50     [] EVAL (LOW) 16954 (typically 20 minutes face-to-face)  [] EVAL (MOD) 10438 (typically 30 minutes face-to-face)  [] EVAL (HIGH) 88208 (typically 45 minutes face-to-face)  [] RE-EVAL   [x] FY(05646) x  1   [] IONTO  [x] NMR (32844) x  1   [] VASO  [] Manual (63456) x       [] Other:  [x] TA (90781) x  1    [] [facility] Traction (28565)  [] ES(attended) (83811)      [] ES (un) (18252):     GOALS:  Patient stated goal: full motion of R shoulder    Therapist goals for Patient:   Short Term Goals: To be achieved in: 2 weeks  1. Independent in HEP and progression per patient tolerance, in order to prevent re-injury. 2. Patient will have a decrease in pain to facilitate improvement in movement, function, and ADLs as indicated by Functional Deficits. Long Term Goals: To be achieved in: 12 weeks  1.  Disability index score of 10% or less for the DASH to assist with reaching prior level of function. 2. Patient will demonstrate increased AROM to 165 elevation,  head for ER, & waist for IR to allow for proper joint functioning as indicated by patients Functional Deficits. Progressing  3. Patient will demonstrate an increase in Strength to 4+/5 to allow for proper functional mobility as indicated by patients Functional Deficits. 4. Patient will return to all functional activities without increased symptoms or restriction. 5. Pt will be able to lift R UE for function w/ deviations. (patient specific functional goal)       Progression Towards Functional goals:  [] Patient is progressing as expected towards functional goals listed. [] Progression is slowed due to complexities listed. [] Progression has been slowed due to co-morbidities. [x] Plan just implemented, too soon to assess goals progression  [] Other:     ASSESSMENT:  Pt has good motion in shoulder. Strength progressing in shoulder. Reviewed precautions for shoulder rehab to avoid heavy lifting. Pt demonstrated good tech w/ exercises. Fatigue noted from program.    Treatment/Activity Tolerance:  [x] Patient tolerated treatment well [] Patient limited by fatique  [] Patient limited by pain  [] Patient limited by other medical complications  [] Other:     Prognosis: [x] Good [] Fair  [] Poor    Patient Requires Follow-up: [x] Yes  [] No    PLAN: Cont to monitor that pt is following protocol. Cont therapy after massive RTC repair.   [x] Continue per plan of care [] Alter current plan (see comments)  [x] Plan of care initiated [] Hold pending MD visit [] Discharge    Electronically signed by: Michaelyn Nissen PT, Susan Sanford 86, OMT-C    Physical Therapist [place] license #[id number]  Physical Therapist [place] license #[id number]

## 2019-06-05 ENCOUNTER — TREATMENT (OUTPATIENT)
Dept: PHYSICAL THERAPY | Age: 64
End: 2019-06-05
Payer: MEDICARE

## 2019-06-05 DIAGNOSIS — M75.121 COMPLETE TEAR OF RIGHT ROTATOR CUFF: Primary | ICD-10-CM

## 2019-06-05 DIAGNOSIS — G89.29 CHRONIC RIGHT SHOULDER PAIN: ICD-10-CM

## 2019-06-05 DIAGNOSIS — M25.511 CHRONIC RIGHT SHOULDER PAIN: ICD-10-CM

## 2019-06-05 PROCEDURE — 97112 NEUROMUSCULAR REEDUCATION: CPT | Performed by: PHYSICAL THERAPIST

## 2019-06-05 PROCEDURE — 97530 THERAPEUTIC ACTIVITIES: CPT | Performed by: PHYSICAL THERAPIST

## 2019-06-05 PROCEDURE — 97110 THERAPEUTIC EXERCISES: CPT | Performed by: PHYSICAL THERAPIST

## 2019-06-05 NOTE — PLAN OF CARE
[facility]   Phone: [phone]    Fax: [phone]        Physical Therapy Daily Treatment Note/POC    Physical Therapy Re-Certification Plan of Care    Dear  Dr. Anu Brown,    We had the pleasure of treating the following patient for physical therapy services at [address] [place] [address]. A summary of our findings can be found in the updated assessment below. This includes our plan of care. If you have any questions or concerns regarding these findings, please do not hesitate to contact me at the office phone number checked above. Thank you for the referral.     Physician Signature:________________________________Date:__________________  By signing above (or electronic signature), therapists plan is approved by physician      Overall Response to Treatment:   []Patient is responding well to treatment and improvement is noted with regards  to goals   []Patient should continue to improve in reasonable time if they continue HEP   []Patient has plateaued and is no longer responding to skilled PT intervention    []Patient is getting worse and would benefit from return to referring MD   []Patient unable to adhere to initial POC   []Other: Pt has been seen sporadically in clinic for therapy after massive RTC surg. Pt was taking percocet from primary care MD but has ran out currently w/ refill scheduled for next week. Pt needs continued cuing to not do too much. Pt has been lifting too much, including concrete bags using B UE. Pt is being progressed as able when he attends therapy. Rehab in some ways slower than expected due to poor attendance but UEFI survey progressing w/ function. Recommend continuing therapy.       Date:  2019    Patient Name:  Latrell Flood    :  1955  MRN: [id number]  Medical/Treatment Diagnosis Information:  · Diagnosis: R massive RTC, SAD, Clementine on 28     Insurance/Certification information:  PT Insurance Information: [employer] $10 copayment; med nec  Physician Information:  Referring Practitioner: Thai Choe of care signed (Y/N): signed 3/29/19,    Date of Patient follow up with Physician:     G-Code (if applicable):      Date G-Code Applied: 3/29/19    PT G-Codes  Functional Assessment Tool Used: UEFI  Score: 10/80=8%  Functional Limitation: Carrying, moving and handling objects    6/5/19 UEFI 36/80=40%    Progress Note: [x]  Yes  []  No  Next due by: 7/5/19      Latex Allergy:  [x]NO      []YES  Preferred Language for Healthcare:   [x]English       []other:    Visit # Insurance Allowable   7 Med nec     Pain level:  9/10 worst in last week for R shoulder; 5-6/10 constant in shoulder;     SUBJECTIVE: Pt states that he is waking up at night due to pain. He ran out of percocet that his primary care MD was giving him and not able to get a refill until next week. He has been good and not doing anything this week.      13 weeks s/p surg    OBJECTIVE: See eval  Observation:   Test measurements:      ROM PROM AROM  Comment    L R L R 6/5/19   Flexion  140 w/ table slides      Abduction  140 w/ table slides      ER  40+      IR        Other        Other             RESTRICTIONS/PRECAUTIONS: stroke;  4/30/19 flex and abd 140, ER 40, IR up back, strengthening bicep, triceps and back    Exercises/Interventions:   Exercises:  Exercise/Equipment Resistance/Repetitions Comments   Cardio/Warm-up     Bike          Stretching/PROM     Wand ER @0 & 90 10\"x10    Table Slides flex and scaption 10\"x10    UE [place]     Pulleys x4 min    Pendulum      IR using towel to back 10\"x10         STRENGTH     Isometrics     Retraction          Weight shift     Flexion     Abduction  Muscle activation   External Rotation     Internal Rotation     Biceps     Triceps          PRE's     Flexion 1# 2x10     NPV sitting    Abduction 1# 2x10     NPV sitting    External Rotation 0# 2x10    Internal Rotation     Shrugs     EXT     Reverse Flys     Serratus/punches 3# x30    Horizontal Abd with ER     Biceps 1# 3x10    Triceps     Retraction     Wrist flex and ext          Cable Column/Theraband     External Rotation OTB 2x10    Internal Rotation OTB 2x10    Shrugs     Lats     Ext Tan TB 3x10    Flex     Scapular Retraction Tan TB 3x10    BIC     TRIC PTB 3x10    PNF          Neuromuscular Re-ed     Dynamic Stability          Fitting sling     Ed on POC, expectation & precautions x10 min, 6/5/19 reviewed and ed pt on healing process & precautions NEEDS REVIEWING             [employer]ack                    Manual/Modalities                     PQRS:   Reviewed medication list with patient. No changes since last PT visit. - not currently taking percocet; Therapeutic Exercise and NMR EXR  [] (56501) Provided verbal/tactile cueing for activities related to strengthening, flexibility, endurance, ROM  for improvements in scapular, scapulothoracic and UE control with self care, reaching, carrying, lifting, house/yardwork, driving/computer work.    [] (28116) Provided verbal/tactile cueing for activities related to improving balance, coordination, kinesthetic sense, posture, motor skill, proprioception  to assist with  scapular, scapulothoracic and UE control with self care, reaching, carrying, lifting, house/yardwork, driving/computer work. Therapeutic Activities:    [] (76179 or 45004) Provided verbal/tactile cueing for activities related to improving balance, coordination, kinesthetic sense, posture, motor skill, proprioception and motor activation to allow for proper function of scapular, scapulothoracic and UE control with self care, carrying, lifting, driving/computer work.      Home Exercise Program:    [x] (20372) Reviewed/Progressed HEP activities related to strengthening, flexibility, endurance, ROM of scapular, scapulothoracic and UE control with self care, reaching, carrying, lifting, house/yardwork, driving/computer work 6/5/19 reviewed HEP & activities at home of not lifting;  [] (04342) Functional Deficits. Progressing  3. Patient will demonstrate an increase in Strength to 4+/5 to allow for proper functional mobility as indicated by patients Functional Deficits. Progressing  4. Patient will return to all functional activities without increased symptoms or restriction. Progressing  5. Pt will be able to lift R UE for function w/ deviations. (patient specific functional goal)       Progression Towards Functional goals:  [] Patient is progressing as expected towards functional goals listed. [] Progression is slowed due to complexities listed. [] Progression has been slowed due to co-morbidities. [x] Plan just implemented, too soon to assess goals progression  [] Other:     ASSESSMENT:  See above. Treatment/Activity Tolerance:  [x] Patient tolerated treatment well [] Patient limited by fatique  [] Patient limited by pain  [] Patient limited by other medical complications  [] Other:     Prognosis: [x] Good [] Fair  [] Poor    Patient Requires Follow-up: [x] Yes  [] No    PLAN: Cont therapy 1-2 x/wk for 6 wk for TE, TA, NM, MT and mod PRN  Cont to monitor that pt is following protocol. Cont therapy after massive RTC repair.   [x] Continue per plan of care [] Alter current plan (see comments)  [x] Plan of care initiated [] Hold pending MD visit [] Discharge    Electronically signed by: Alex Bradshaw PT, Susan Sanford 86, OMT-C    Physical Therapist [place] license #[id number]  Physical Therapist [place] license #[id number]

## 2019-06-19 ENCOUNTER — TREATMENT (OUTPATIENT)
Dept: PHYSICAL THERAPY | Age: 64
End: 2019-06-19
Payer: MEDICARE

## 2019-06-19 DIAGNOSIS — G89.29 CHRONIC RIGHT SHOULDER PAIN: ICD-10-CM

## 2019-06-19 DIAGNOSIS — M75.121 COMPLETE TEAR OF RIGHT ROTATOR CUFF, UNSPECIFIED WHETHER TRAUMATIC: Primary | ICD-10-CM

## 2019-06-19 DIAGNOSIS — M25.511 CHRONIC RIGHT SHOULDER PAIN: ICD-10-CM

## 2019-06-19 PROCEDURE — 97530 THERAPEUTIC ACTIVITIES: CPT | Performed by: PHYSICAL THERAPIST

## 2019-06-19 PROCEDURE — 97110 THERAPEUTIC EXERCISES: CPT | Performed by: PHYSICAL THERAPIST

## 2019-06-19 PROCEDURE — 97112 NEUROMUSCULAR REEDUCATION: CPT | Performed by: PHYSICAL THERAPIST

## 2019-06-24 ENCOUNTER — TREATMENT (OUTPATIENT)
Dept: PHYSICAL THERAPY | Age: 64
End: 2019-06-24
Payer: MEDICARE

## 2019-06-24 DIAGNOSIS — M25.511 CHRONIC RIGHT SHOULDER PAIN: Primary | ICD-10-CM

## 2019-06-24 DIAGNOSIS — M75.121 COMPLETE TEAR OF RIGHT ROTATOR CUFF, UNSPECIFIED WHETHER TRAUMATIC: ICD-10-CM

## 2019-06-24 DIAGNOSIS — G89.29 CHRONIC RIGHT SHOULDER PAIN: Primary | ICD-10-CM

## 2019-06-24 PROCEDURE — 97112 NEUROMUSCULAR REEDUCATION: CPT | Performed by: PHYSICAL THERAPIST

## 2019-06-24 PROCEDURE — 97530 THERAPEUTIC ACTIVITIES: CPT | Performed by: PHYSICAL THERAPIST

## 2019-06-24 PROCEDURE — 97110 THERAPEUTIC EXERCISES: CPT | Performed by: PHYSICAL THERAPIST

## 2019-06-24 NOTE — FLOWSHEET NOTE
[facility]   Phone: [phone]    Fax: [phone]        Physical Therapy Daily Treatment Note       Date:  2019    Patient Name:  Heather Hoffman    :  1955  MRN: [id number]  Medical/Treatment Diagnosis Information:  · Diagnosis: R massive RTC, SAD, Clementine on      Insurance/Certification information:  PT Insurance Information: [employer] $10 copayment; med nec  Physician Information:  Referring Practitioner: Robb Parmar of care signed (Y/N): signed 3/29/19, 5/3/19,  19    Date of Patient follow up with Physician:     G-Code (if applicable):      Date G-Code Applied: 3/29/19    PT G-Codes  Functional Assessment Tool Used: UEFI  Score: 1080=8%  Functional Limitation: Carrying, moving and handling objects    19 UEFI 36/80=40%    Progress Note: [x]  Yes  []  No  Next due by: 19      Latex Allergy:  [x]NO      []YES  Preferred Language for Healthcare:   [x]English       []other:    Visit # Insurance Allowable   9 Med nec     Pain level:  2/10 on avg but not constant     SUBJECTIVE: Pt states his shoulder is hurting more today but denies inc in activity. He has not iced.      16+ weeks s/p surg    OBJECTIVE: See eval  Observation:   Test measurements:      ROM PROM AROM  Comment    L R L R 19   Flexion  140 w/ table slides      Abduction  140 w/ table slides      ER  40+      IR        Other        Other             RESTRICTIONS/PRECAUTIONS: stroke;  19 flex and abd 140, ER 40, IR up back, strengthening bicep, triceps and back    Exercises/Interventions:   Exercises:  Exercise/Equipment Resistance/Repetitions Comments   Cardio/Warm-up     Bike          Stretching/PROM     Wand ER @0 & 90 10\"x10    Table Slides     UE [place] Flex on inclined table    Wall walks 10\"x10 flex    Pulleys x4 min    Pendulum      IR using towel to back 10\"x10         STRENGTH     Isometrics     Retraction          Weight shift     Flexion     Abduction  Muscle activation   External Rotation     Internal Rotation     Biceps     Triceps          PRE's     Flexion 2# 2x10 supine         Abduction 2#-1# 2x10 side lying         External Rotation 1# 2x10    Internal Rotation     Shrugs     EXT [place] 3x10    Reverse Flys     Serratus/punches 3# x30    Horizontal Abd with ER Prone 3x10    Biceps 3# 3x10    Triceps     Retraction     Wrist flex and ext          Cable Column/Theraband     External Rotation    Internal Rotation    Shrugs    Lats    Ext    Flex    Scapular Retraction    BIC    TRIC    PNF          Neuromuscular Re-ed     Dynamic Stability          Fitting sling     Ed on POC, expectation & precautions x10 min, 6/5/19 reviewed and ed pt on healing process & precautions NEEDS REVIEWING             [employer]                    Manual/Modalities                     PQRS:   Reviewed medication list with patient. No changes since last PT visit. - taking percocet again for pain in other body parts; Therapeutic Exercise and NMR EXR  [] (92192) Provided verbal/tactile cueing for activities related to strengthening, flexibility, endurance, ROM  for improvements in scapular, scapulothoracic and UE control with self care, reaching, carrying, lifting, house/yardwork, driving/computer work.    [] (90845) Provided verbal/tactile cueing for activities related to improving balance, coordination, kinesthetic sense, posture, motor skill, proprioception  to assist with  scapular, scapulothoracic and UE control with self care, reaching, carrying, lifting, house/yardwork, driving/computer work. Therapeutic Activities:    [] (23957 or 38617) Provided verbal/tactile cueing for activities related to improving balance, coordination, kinesthetic sense, posture, motor skill, proprioception and motor activation to allow for proper function of scapular, scapulothoracic and UE control with self care, carrying, lifting, driving/computer work.      Home Exercise Program:    [x] (24512) Reviewed/Progressed HEP activities related to strengthening, flexibility, endurance, ROM of scapular, scapulothoracic and UE control with self care, reaching, carrying, lifting, house/yardwork, driving/computer work 6/5/19 reviewed HEP & activities at home of not lifting;  [] (17381) Reviewed/Progressed HEP activities related to improving balance, coordination, kinesthetic sense, posture, motor skill, proprioception of scapular, scapulothoracic and UE control with self care, reaching, carrying, lifting, house/yardwork, driving/computer work      Manual Treatments:  PROM / STM / Oscillations-Mobs:  G-I, II, III, IV (PA's, Inf., Post.)  [] (26419) Provided manual therapy to mobilize soft tissue/joints of cervical/CT, scapular GHJ and UE for the purpose of modulating pain, promoting relaxation,  increasing ROM, reducing/eliminating soft tissue swelling/inflammation/restriction, improving soft tissue extensibility and allowing for proper ROM for normal function with self care, reaching, carrying, lifting, house/yardwork, driving/computer work    Modalities:  CP x10 min    Charges:  Timed Code Treatment Minutes: 40   Total Treatment Minutes: 50     [] EVAL (LOW) 88263 (typically 20 minutes face-to-face)  [] EVAL (MOD) 11447 (typically 30 minutes face-to-face)  [] EVAL (HIGH) 55957 (typically 45 minutes face-to-face)  [] RE-EVAL   [x] DL(75251) x  1   [] IONTO  [x] NMR (41019) x  1   [] VASO  [] Manual ([id number]) x       [] Other:  [x] TA (13449) x  1    [] [facility] Traction (38162)  [] ES(attended) (15377)      [] ES (un) (14582):     GOALS:  Patient stated goal: full motion of R shoulder    Therapist goals for Patient:   Short Term Goals: To be achieved in: 2 weeks  1. Independent in HEP and progression per patient tolerance, in order to prevent re-injury. 2. Patient will have a decrease in pain to facilitate improvement in movement, function, and ADLs as indicated by Functional Deficits. Long Term Goals: To be achieved in: 12 weeks  1. Disability index score of 10% or less for the DASH to assist with reaching prior level of function. Not MET  2. Patient will demonstrate increased AROM to 165 elevation,  head for ER, & waist for IR to allow for proper joint functioning as indicated by patients Functional Deficits. Progressing  3. Patient will demonstrate an increase in Strength to 4+/5 to allow for proper functional mobility as indicated by patients Functional Deficits. Progressing  4. Patient will return to all functional activities without increased symptoms or restriction. Progressing  5. Pt will be able to lift R UE for function w/ deviations. (patient specific functional goal)       Progression Towards Functional goals:  [] Patient is progressing as expected towards functional goals listed. [] Progression is slowed due to complexities listed. [] Progression has been slowed due to co-morbidities. [x] Plan just implemented, too soon to assess goals progression  [] Other:     ASSESSMENT:  Pt had pain in [facility] jt but not tender in RTC tendons. Modified program due to pain. Therapy focused on muscle activation for posterior muscles. Avoided pain today. Delt muscle in R shoulder limited. Cuing given on deviations due to some shoulder hiking vs shoulder lifting from for reaching outward. Treatment/Activity Tolerance:  [x] Patient tolerated treatment well [] Patient limited by fatique  [] Patient limited by pain  [] Patient limited by other medical complications  [] Other:     Prognosis: [x] Good [] Fair  [] Poor    Patient Requires Follow-up: [x] Yes  [] No    PLAN: Cont therapy 1-2 x/wk for 6 wk for TE, TA, NM, MT and mod PRN  Cont to monitor that pt is following protocol. Cont therapy after massive RTC repair.   [x] Continue per plan of care [] Alter current plan (see comments)  [x] Plan of care initiated [] Hold pending MD visit [] Discharge    Electronically signed by: Myra Avaols PT, MS, OMT-C    Physical Therapist [place] license #[id number]  Physical Therapist [place] license #[id number]

## 2019-06-25 ENCOUNTER — OFFICE VISIT (OUTPATIENT)
Dept: ORTHOPEDIC SURGERY | Age: 64
End: 2019-06-25
Payer: MEDICARE

## 2019-06-25 VITALS
HEART RATE: 68 BPM | SYSTOLIC BLOOD PRESSURE: 137 MMHG | BODY MASS INDEX: 37.03 KG/M2 | DIASTOLIC BLOOD PRESSURE: 83 MMHG | WEIGHT: 250 LBS | HEIGHT: 69 IN

## 2019-06-25 DIAGNOSIS — Z98.890 S/P RIGHT ROTATOR CUFF REPAIR: ICD-10-CM

## 2019-06-25 DIAGNOSIS — M19.011 ARTHRITIS OF RIGHT ACROMIOCLAVICULAR JOINT: ICD-10-CM

## 2019-06-25 DIAGNOSIS — Z98.890 S/P SHOULDER SURGERY: Primary | ICD-10-CM

## 2019-06-25 PROCEDURE — 1123F ACP DISCUSS/DSCN MKR DOCD: CPT | Performed by: ORTHOPAEDIC SURGERY

## 2019-06-25 PROCEDURE — 3017F COLORECTAL CA SCREEN DOC REV: CPT | Performed by: ORTHOPAEDIC SURGERY

## 2019-06-25 PROCEDURE — G8417 CALC BMI ABV UP PARAM F/U: HCPCS | Performed by: ORTHOPAEDIC SURGERY

## 2019-06-25 PROCEDURE — 99213 OFFICE O/P EST LOW 20 MIN: CPT | Performed by: ORTHOPAEDIC SURGERY

## 2019-06-25 PROCEDURE — G8427 DOCREV CUR MEDS BY ELIG CLIN: HCPCS | Performed by: ORTHOPAEDIC SURGERY

## 2019-06-25 PROCEDURE — 4004F PT TOBACCO SCREEN RCVD TLK: CPT | Performed by: ORTHOPAEDIC SURGERY

## 2019-06-25 PROCEDURE — 4040F PNEUMOC VAC/ADMIN/RCVD: CPT | Performed by: ORTHOPAEDIC SURGERY

## 2019-06-25 NOTE — PROGRESS NOTES
Primary?  S/P shoulder surgery Yes    S/P right rotator cuff repair     Arthritis of right acromioclavicular joint        Office Procedures:  Orders Placed This Encounter   Procedures    Ambulatory referral to Physical Therapy     Referral Priority:   Routine     Referral Type:   Eval and Treat     Referral Reason:   Specialty Services Required     Requested Specialty:   Physical Therapy     Number of Visits Requested:   1       Plan:   Patient is a 69-year-old male who is seen now 3-1/2 months after his right rotator cuff repair. He is progressing adequately with his motion however he still has some strength gains to be made. We recommend he continue in formal physical therapy as well as doing his home exercises to increase his rotator cuff strength. He has been doing some things that would be considered noncompliant such as lifting heavy bags of cement and we recommended he not do these until at least 6 months. He also should cut back on the smoking we had a long discussion about that today. We will see him back in 6 weeks and/or sooner if needed. Marko Hurtado IV, D.O. Clinical Fellow, [address] [place] [address]  Date:    6/25/2019      The encounter with Charlie Jacob was supervised by Dr. Aubree Mcclendon, who personally examined the patient and reviewed the plan. This dictation was performed with a verbal recognition program (DRAGON) and it was checked for errors. It is possible that there are still dictated errors within this office note. If so, please bring any errors to my attention for an addendum. All efforts were made to ensure that this office note is accurate.  ____________________  I was physically present and personally supervised the Orthopaedic Sports Medicine Fellow in the evaluation and development of a treatment plan for this patient. I personally interviewed the patient and performed a physical examination.  In addition, I

## 2019-07-01 ENCOUNTER — TREATMENT (OUTPATIENT)
Dept: PHYSICAL THERAPY | Age: 64
End: 2019-07-01

## 2019-08-06 ENCOUNTER — OFFICE VISIT (OUTPATIENT)
Dept: ORTHOPEDIC SURGERY | Age: 64
End: 2019-08-06
Payer: MEDICARE

## 2019-08-06 VITALS
DIASTOLIC BLOOD PRESSURE: 69 MMHG | SYSTOLIC BLOOD PRESSURE: 123 MMHG | BODY MASS INDEX: 37.03 KG/M2 | WEIGHT: 250 LBS | HEIGHT: 69 IN | HEART RATE: 60 BPM

## 2019-08-06 DIAGNOSIS — M19.011 ARTHRITIS OF RIGHT ACROMIOCLAVICULAR JOINT: ICD-10-CM

## 2019-08-06 DIAGNOSIS — Z98.890 S/P SHOULDER SURGERY: Primary | ICD-10-CM

## 2019-08-06 DIAGNOSIS — Z98.890 S/P RIGHT ROTATOR CUFF REPAIR: ICD-10-CM

## 2019-08-06 PROBLEM — R79.89 ELEVATED TROPONIN: Status: ACTIVE | Noted: 2018-02-09

## 2019-08-06 PROBLEM — R77.8 ELEVATED TROPONIN: Status: ACTIVE | Noted: 2018-02-09

## 2019-08-06 PROCEDURE — 99213 OFFICE O/P EST LOW 20 MIN: CPT | Performed by: ORTHOPAEDIC SURGERY

## 2019-08-06 PROCEDURE — 1123F ACP DISCUSS/DSCN MKR DOCD: CPT | Performed by: ORTHOPAEDIC SURGERY

## 2019-08-06 PROCEDURE — G8598 ASA/ANTIPLAT THER USED: HCPCS | Performed by: ORTHOPAEDIC SURGERY

## 2019-08-06 PROCEDURE — G8427 DOCREV CUR MEDS BY ELIG CLIN: HCPCS | Performed by: ORTHOPAEDIC SURGERY

## 2019-08-06 PROCEDURE — G8417 CALC BMI ABV UP PARAM F/U: HCPCS | Performed by: ORTHOPAEDIC SURGERY

## 2019-08-06 PROCEDURE — 4040F PNEUMOC VAC/ADMIN/RCVD: CPT | Performed by: ORTHOPAEDIC SURGERY

## 2019-08-06 PROCEDURE — 3017F COLORECTAL CA SCREEN DOC REV: CPT | Performed by: ORTHOPAEDIC SURGERY

## 2019-08-06 PROCEDURE — 4004F PT TOBACCO SCREEN RCVD TLK: CPT | Performed by: ORTHOPAEDIC SURGERY

## 2019-08-06 RX ORDER — PREDNISONE 10 MG/1
15 TABLET ORAL
COMMUNITY

## 2019-08-06 RX ORDER — OXYCODONE AND ACETAMINOPHEN 2.5; 325 MG/1; MG/1
1 TABLET ORAL
COMMUNITY
Start: 2019-07-15 | End: 2019-08-14

## 2019-08-06 RX ORDER — GABAPENTIN 300 MG/1
CAPSULE ORAL
Refills: 0 | COMMUNITY
Start: 2019-07-05 | End: 2022-01-13

## 2019-08-06 RX ORDER — VALSARTAN 40 MG/1
TABLET ORAL
Refills: 3 | COMMUNITY
Start: 2019-07-29

## 2019-08-06 NOTE — PROGRESS NOTES
Chief Complaint    Shoulder Pain (F/u right shoulder)      History of Present Illness:  Nelda Terrazas is a pleasant, 71 y.o., male, here today for follow up of his right shoulder. He is now 5 months out from a massive rotator cuff repair with distal clavicle excision. He is very happy with his progress. He reports no new injuries or setbacks. Medical History:  Patient's medications, allergies, past medical, surgical, social and family histories were reviewed and updated as appropriate. Patient has had no medical changes since last evaluated      Review of Systems  A 14 point review of systems was completed by the patient on 1/24/19 and is available in the media section of the scanned medical record and was reviewed on 8/6/2019. The review is negative with the exception of those things mentioned in the HPI and Past Medical History    Vital Signs:  Vitals:    08/06/19 1102   BP: 123/69   Pulse: 60       General/Appearance: Alert and oriented and in no apparent distress. Skin:  There are no skin lesions, cellulitis, or extreme edema. The patient has warm and well-perfused Bilateral upper extremities with brisk capillary refill. RIGHT Shoulder Exam:  Inspection: Incision portals healing well. No gross deformities, no signs of infection. Palpation: Nontender to palpate    Active Range of Motion: Forward Elevation 150, Internal Rotation L3 bilaterally    Passive Range of Motion: Deferred    Strength:  External Rotation 4/5, biceps 5/5    Special Tests:  Negative Lag No Niranjan muscle deformity. Neurovascular: Sensation to light touch is intact, no motor deficits, palpable radial pulses 2+        Radiology:     No new XR obtained at this time. Assessment :  Mr. Nelda Terrazas is a pleasant, 71 y.o. patient who is 5 months out from a massive rotator cuff repair with distal clavicle excision. Impression:  Encounter Diagnoses   Name Primary?     S/P shoulder surgery Yes    S/P right

## 2019-09-05 PROBLEM — R77.8 ELEVATED TROPONIN: Status: RESOLVED | Noted: 2018-02-09 | Resolved: 2019-09-05

## 2019-09-05 PROBLEM — R79.89 ELEVATED TROPONIN: Status: RESOLVED | Noted: 2018-02-09 | Resolved: 2019-09-05

## 2021-06-10 NOTE — FLOWSHEET NOTE
Assessment/Plan:         Problem List Items Addressed This Visit        Endocrine    Type 2 diabetes mellitus without complication, without long-term current use of insulin ([address] ) - Primary       Lab Results   Component Value Date    HGBA1C 7 6 (A) 06/10/2021   Doing well A1c% is 7 6%, continue meds         Relevant Orders    POCT hemoglobin A1c (Completed)       Cardiovascular and Mediastinum    Essential hypertension     Well controlled            Other    Pure hypercholesterolemia      Other Visit Diagnoses     BMI 32 0-32 9,adult        COVID-19 vaccine series completed        Standardized adult depression screening tool completed                Subjective:      Patient ID: Alyson Quigley is a 79 y o  female  [place] here with hx of obesity, DM II A1c% of 7 6%, HTN, HL-doing great-got her SARS covid vaccine- is traveling to [place] and [place] this summer-very active-took out her pessary       The following portions of the patient's history were reviewed and updated as appropriate:   Past Medical History:  She has a past medical history of Allergy to sulfa drugs, H/O multiple allergies, Hyperlipidemia, Hypertension, Nocturia, Palpitations, Thrombocytopenic disorder ([address] ), Type 2 diabetes mellitus without complication ([address] ), and Wears glasses  ,  _______________________________________________________________________  Medical Problems:  does not have any pertinent problems on file ,  _______________________________________________________________________  Past Surgical History:   has a past surgical history that includes Hysterectomy and Hernia repair  ,  _______________________________________________________________________  Family History:  family history includes Diabetes in her mother; Heart disease in her father; Hypertension in her father and mother; Other in her mother ,  _______________________________________________________________________  Social History:   reports that she has never smoked   She has never used [facility]   Phone: [phone]    Fax: [phone]        Physical Therapy Daily Treatment Note       Date:  2019    Patient Name:  Suzanna Dutton    :  1955  MRN: [id number]  Medical/Treatment Diagnosis Information:  · Diagnosis: R massive RTC, SAD, Clementine on 30     Insurance/Certification information:  PT Insurance Information: [employer] $10 copayment; med nec  Physician Information:  Referring Practitioner: Melinda Espinoza of care signed (Y/N): signed 3/29/19, 5/3/19,  19    Date of Patient follow up with Physician:     G-Code (if applicable):      Date G-Code Applied: 3/29/19    PT G-Codes  Functional Assessment Tool Used: UEFI  Score: 1080=8%  Functional Limitation: Carrying, moving and handling objects    19 UEFI 36/80=40%    Progress Note: [x]  Yes  []  No  Next due by: 19      Latex Allergy:  [x]NO      []YES  Preferred Language for Healthcare:   [x]English       []other:    Visit # Insurance Allowable   8 Med nec     Pain level:  2/10 on avg but not constant     SUBJECTIVE: Pt states that he is back on percocet from family doctor that is helping his shoulder. He is not doing too much for fear that he is irritating his shoulder.      15+ weeks s/p surg    OBJECTIVE: See eval  Observation:   Test measurements:      ROM PROM AROM  Comment    L R L R 19   Flexion  140 w/ table slides      Abduction  140 w/ table slides      ER  40+      IR        Other        Other             RESTRICTIONS/PRECAUTIONS: stroke;  19 flex and abd 140, ER 40, IR up back, strengthening bicep, triceps and back    Exercises/Interventions:   Exercises:  Exercise/Equipment Resistance/Repetitions Comments   Cardio/Warm-up     Bike          Stretching/PROM     Wand ER @0 & 90 10\"x10    Table Slides     UE [place] Flex on inclined table    Wall walks 10\"x10 flex    Pulleys x4 min    Pendulum      IR using towel to back 10\"x10         STRENGTH     Isometrics     Retraction      smokeless tobacco  She reports current alcohol use  She reports that she does not use drugs  ,  _______________________________________________________________________  Allergies:  is allergic to accupril [quinapril hcl]; novocain [procaine]; parabens; and sulfa antibiotics     _______________________________________________________________________  Current Outpatient Medications   Medication Sig Dispense Refill    ezetimibe-simvastatin (VYTORIN) 10-20 mg per tablet TAKE 1 TABLET BY MOUTH  EVERY DAY AT BEDTIME 90 tablet 3    Glucosamine HCl (GLUCOSAMINE PO) 1 po daily      glucose blood (FREESTYLE TEST STRIPS) test strip Use 1 each daily Use as instructed 100 each 5    metFORMIN (GLUCOPHAGE) 850 mg tablet TAKE 1 TABLET BY MOUTH  TWICE A  tablet 3    metoprolol succinate (TOPROL-XL) 50 mg 24 hr tablet TAKE 1 TABLET BY MOUTH  DAILY 90 tablet 3    potassium chloride (K-DUR,KLOR-CON) 20 mEq tablet TAKE 1 TABLET BY MOUTH  EVERY DAY 90 tablet 3    valsartan-hydrochlorothiazide (DIOVAN-HCT) 160-12 5 MG per tablet TAKE 1 TABLET BY MOUTH  EVERY DAY 90 tablet 3     No current facility-administered medications for this visit       _______________________________________________________________________  Review of Systems   Constitutional: Negative  HENT: Negative  Respiratory: Negative  Cardiovascular: Negative  Musculoskeletal: Negative  Hematological: Negative  Psychiatric/Behavioral: Negative  Objective:  Vitals:    06/10/21 1043 06/10/21 1052   BP: 118/80 116/70   BP Location: Right arm Left arm   Patient Position: Sitting Sitting   Cuff Size: Adult Adult   Pulse: 82    Resp: 16    Temp: (!) 97 2 °F (36 2 °C)    TempSrc: Temporal    SpO2: 99%    Weight: 80 3 kg (177 lb)    Height: 5' 2" (1 575 m)      Body mass index is 32 37 kg/m²  Physical Exam  Constitutional:       Appearance: She is obese  HENT:      Head: Normocephalic and atraumatic        Right Ear: External ear normal  Left Ear: External ear normal       Nose: Nose normal       Mouth/Throat:      Mouth: Mucous membranes are moist    Eyes:      Extraocular Movements: Extraocular movements intact  Pupils: Pupils are equal, round, and reactive to light  Neck:      Musculoskeletal: Normal range of motion and neck supple  Cardiovascular:      Rate and Rhythm: Normal rate and regular rhythm  Pulses: no weak pulses          Dorsalis pedis pulses are 2+ on the right side and 2+ on the left side  Posterior tibial pulses are 2+ on the right side and 2+ on the left side  Pulmonary:      Effort: Pulmonary effort is normal       Breath sounds: Normal breath sounds  Abdominal:      General: Abdomen is flat  Musculoskeletal: Normal range of motion  Feet:      Right foot:      Skin integrity: No ulcer, skin breakdown, erythema, warmth, callus or dry skin  Left foot:      Skin integrity: No ulcer, skin breakdown, erythema, warmth, callus or dry skin  Skin:     General: Skin is warm  Capillary Refill: Capillary refill takes less than 2 seconds  Neurological:      General: No focal deficit present  Mental Status: She is alert and oriented to person, place, and time  Psychiatric:         Mood and Affect: Mood normal          Behavior: Behavior normal          Thought Content: Thought content normal          Judgment: Judgment normal        Patient's shoes and socks removed  Right Foot/Ankle   Right Foot Inspection  Skin Exam: skin normal and skin intact no dry skin, no warmth, no callus, no erythema, no maceration, no abnormal color, no pre-ulcer, no ulcer and no callus                          Toe Exam: ROM and strength within normal limits  Sensory       Monofilament testing: intact  Vascular  Capillary refills: < 3 seconds  The right DP pulse is 2+  The right PT pulse is 2+       Left Foot/Ankle  Left Foot Inspection  Skin Exam: skin normal and skin intactno dry skin, no warmth, no erythema, no driving/computer work. Home Exercise Program:    [x] (12617) Reviewed/Progressed HEP activities related to strengthening, flexibility, endurance, ROM of scapular, scapulothoracic and UE control with self care, reaching, carrying, lifting, house/yardwork, driving/computer work 6/5/19 reviewed HEP & activities at home of not lifting;  [] (88715) Reviewed/Progressed HEP activities related to improving balance, coordination, kinesthetic sense, posture, motor skill, proprioception of scapular, scapulothoracic and UE control with self care, reaching, carrying, lifting, house/yardwork, driving/computer work      Manual Treatments:  PROM / Christopher Laughter / Oscillations-Mobs:  G-I, II, III, IV (PA's, Inf., Post.)  [] ([id number]) Provided manual therapy to mobilize soft tissue/joints of cervical/CT, scapular GHJ and UE for the purpose of modulating pain, promoting relaxation,  increasing ROM, reducing/eliminating soft tissue swelling/inflammation/restriction, improving soft tissue extensibility and allowing for proper ROM for normal function with self care, reaching, carrying, lifting, house/yardwork, driving/computer work    Modalities:  CP x10 min    Charges:  Timed Code Treatment Minutes: 40   Total Treatment Minutes: 50     [] EVAL (LOW) 71219 (typically 20 minutes face-to-face)  [] EVAL (MOD) 14445 (typically 30 minutes face-to-face)  [] EVAL (HIGH) 45676 (typically 45 minutes face-to-face)  [] RE-EVAL   [x] NR(35079) x  1   [] IONTO  [x] NMR (80872) x  1   [] VASO  [] Manual ([id number]) x       [] Other:  [x] TA (36441) x  1    [] Mech Traction (26098)  [] ES(attended) (94967)      [] ES (un) (53537):     GOALS:  Patient stated goal: full motion of R shoulder    Therapist goals for Patient:   Short Term Goals: To be achieved in: 2 weeks  1. Independent in HEP and progression per patient tolerance, in order to prevent re-injury.    2. Patient will have a decrease in pain to facilitate improvement in movement, function, and ADLs as indicated by Functional Deficits. Long Term Goals: To be achieved in: 12 weeks  1. Disability index score of 10% or less for the DASH to assist with reaching prior level of function. Not MET  2. Patient will demonstrate increased AROM to 165 elevation,  head for ER, & waist for IR to allow for proper joint functioning as indicated by patients Functional Deficits. Progressing  3. Patient will demonstrate an increase in Strength to 4+/5 to allow for proper functional mobility as indicated by patients Functional Deficits. Progressing  4. Patient will return to all functional activities without increased symptoms or restriction. Progressing  5. Pt will be able to lift R UE for function w/ deviations. (patient specific functional goal)       Progression Towards Functional goals:  [] Patient is progressing as expected towards functional goals listed. [] Progression is slowed due to complexities listed. [] Progression has been slowed due to co-morbidities. [x] Plan just implemented, too soon to assess goals progression  [] Other:     ASSESSMENT:  Pt has improved motion in shoulder. Pt does have tendency to hike shoulder when lifting UE that required cuing to avoid. Tech did improve w/ concentration and limiting motion. Cuing needed for retraction of scapulae. Weakness noted in shoulder w/ poor posture present. Treatment/Activity Tolerance:  [x] Patient tolerated treatment well [] Patient limited by fatique  [] Patient limited by pain  [] Patient limited by other medical complications  [] Other:     Prognosis: [x] Good [] Fair  [] Poor    Patient Requires Follow-up: [x] Yes  [] No    PLAN: Cont therapy 1-2 x/wk for 6 wk for TE, TA, NM, MT and mod PRN  Cont to monitor that pt is following protocol. Cont therapy after massive RTC repair.   [x] Continue per plan of care [] Alter current plan (see comments)  [x] Plan of care initiated [] Hold pending MD visit [] Discharge    Electronically signed by: Alex Bradshaw PT, maceration, normal color, no pre-ulcer, no ulcer and no callus                         Toe Exam: ROM and strength within normal limits                   Sensory       Monofilament: intact  Vascular  Capillary refills: < 3 seconds  The left DP pulse is 2+  The left PT pulse is 2+  Assign Risk Category:  No deformity present; No loss of protective sensation;  No weak pulses       Risk: 0 MS, ILYA    Physical Therapist [place] license #[id number]  Physical Therapist [place] license #[id number]

## 2022-01-13 ENCOUNTER — OFFICE VISIT (OUTPATIENT)
Dept: ORTHOPEDIC SURGERY | Age: 67
End: 2022-01-13
Payer: MEDICARE

## 2022-01-13 VITALS — BODY MASS INDEX: 37.03 KG/M2 | WEIGHT: 250 LBS | HEIGHT: 69 IN

## 2022-01-13 DIAGNOSIS — M75.82 TENDINITIS OF LEFT ROTATOR CUFF: ICD-10-CM

## 2022-01-13 DIAGNOSIS — M25.512 LEFT SHOULDER PAIN, UNSPECIFIED CHRONICITY: Primary | ICD-10-CM

## 2022-01-13 PROCEDURE — G8484 FLU IMMUNIZE NO ADMIN: HCPCS | Performed by: ORTHOPAEDIC SURGERY

## 2022-01-13 PROCEDURE — G8427 DOCREV CUR MEDS BY ELIG CLIN: HCPCS | Performed by: ORTHOPAEDIC SURGERY

## 2022-01-13 PROCEDURE — 20610 DRAIN/INJ JOINT/BURSA W/O US: CPT | Performed by: ORTHOPAEDIC SURGERY

## 2022-01-13 PROCEDURE — G8417 CALC BMI ABV UP PARAM F/U: HCPCS | Performed by: ORTHOPAEDIC SURGERY

## 2022-01-13 PROCEDURE — 4004F PT TOBACCO SCREEN RCVD TLK: CPT | Performed by: ORTHOPAEDIC SURGERY

## 2022-01-13 PROCEDURE — 1123F ACP DISCUSS/DSCN MKR DOCD: CPT | Performed by: ORTHOPAEDIC SURGERY

## 2022-01-13 RX ORDER — METHYLPREDNISOLONE ACETATE 40 MG/ML
80 INJECTION, SUSPENSION INTRA-ARTICULAR; INTRALESIONAL; INTRAMUSCULAR; SOFT TISSUE ONCE
Status: COMPLETED | OUTPATIENT
Start: 2022-01-13 | End: 2022-01-13

## 2022-01-13 RX ORDER — APIXABAN 5 MG/1
TABLET, FILM COATED ORAL
COMMUNITY
Start: 2021-12-23

## 2022-01-13 RX ORDER — HYDROCHLOROTHIAZIDE 12.5 MG/1
TABLET ORAL
COMMUNITY
Start: 2021-12-09

## 2022-01-13 RX ORDER — ERGOCALCIFEROL 1.25 MG/1
CAPSULE ORAL
COMMUNITY
Start: 2021-12-09

## 2022-01-13 RX ORDER — AMLODIPINE BESYLATE 10 MG/1
TABLET ORAL
COMMUNITY
Start: 2021-11-19

## 2022-01-13 RX ORDER — ATORVASTATIN CALCIUM 40 MG/1
TABLET, FILM COATED ORAL
COMMUNITY
Start: 2021-12-09

## 2022-01-13 RX ORDER — CITALOPRAM 40 MG/1
TABLET ORAL
COMMUNITY
Start: 2021-12-21

## 2022-01-13 RX ORDER — OXYCODONE HYDROCHLORIDE 5 MG/1
TABLET ORAL
COMMUNITY
Start: 2022-01-02

## 2022-01-13 RX ORDER — LIDOCAINE HYDROCHLORIDE 10 MG/ML
8 INJECTION, SOLUTION INFILTRATION; PERINEURAL ONCE
Status: COMPLETED | OUTPATIENT
Start: 2022-01-13 | End: 2022-01-13

## 2022-01-13 RX ADMIN — METHYLPREDNISOLONE ACETATE 80 MG: 40 INJECTION, SUSPENSION INTRA-ARTICULAR; INTRALESIONAL; INTRAMUSCULAR; SOFT TISSUE at 17:19

## 2022-01-13 RX ADMIN — LIDOCAINE HYDROCHLORIDE 8 ML: 10 INJECTION, SOLUTION INFILTRATION; PERINEURAL at 17:18

## 2022-01-13 NOTE — PROGRESS NOTES
Chief Complaint    Shoulder Pain (NP LEFT SHOULDER)      History of Present Illness:  Jacob Lay is a pleasant,  78 y.o. male here today for evaluation of left shoulder pain. The patient reports shoulder pain for 3 to 4 months with no specific trauma. His pain has been worsening and it currently wakes him up at night. The pain is worse with overhead activities. The patient is on Percocet and prednisone for osteoarthritis. As a reminder the patient had a massive rotator cuff repair and distal clavicle excision back in 2019. Patient denies neck pain, numbness or tingling in his upper extremities. Medical History:    No notes on file    Patient's medications, allergies, past medical, surgical, social and family histories were reviewed and updated as appropriate. Past Medical History:   Diagnosis Date    Arthritis     H/O blood clots     High blood pressure     Stroke [facility])       Social History     Socioeconomic History    Marital status:      Spouse name: Not on file    Number of children: Not on file    Years of education: Not on file    Highest education level: Not on file   Occupational History    Not on file   Tobacco Use    Smoking status: Current Every Day Smoker     Packs/day: 1.00     Types: Cigarettes     Start date: 1/24/1989    Smokeless tobacco: Never Used   Substance and Sexual Activity    Alcohol use: Not on file    Drug use: Not on file    Sexual activity: Not on file   Other Topics Concern    Not on file   Social History Narrative    Not on file     Social Determinants of Health     Financial Resource Strain:     Difficulty of Paying Living Expenses: Not on file   Food Insecurity:     Worried About Running Out of Food in the Last Year: Not on file    Bubba of Food in the Last Year: Not on file   Transportation Needs:     Lack of Transportation (Medical): Not on file    Lack of Transportation (Non-Medical):  Not on file   Physical Activity:     Days of Exercise per Week: Not on file    Minutes of Exercise per Session: Not on file   Stress:     Feeling of Stress : Not on file   Social Connections:     Frequency of Communication with Friends and Family: Not on file    Frequency of Social Gatherings with Friends and Family: Not on file    Attends Mandaeism Services: Not on file    Active Member of Clubs or Organizations: Not on file    Attends Club or Organization Meetings: Not on file    Marital Status: Not on file   Intimate Partner Violence:     Fear of Current or Ex-Partner: Not on file    Emotionally Abused: Not on file    Physically Abused: Not on file    Sexually Abused: Not on file   Housing Stability:     Unable to Pay for Housing in the Last Year: Not on file    Number of Places Lived in the Last Year: Not on file    Unstable Housing in the Last Year: Not on file       Allergies   Allergen Reactions    Oxycodone Nausea And Vomiting     Current Outpatient Medications on File Prior to Visit   Medication Sig Dispense Refill    citalopram (CELEXA) 40 MG tablet       hydroCHLOROthiazide (HYDRODIURIL) 12.5 MG tablet       vitamin D (ERGOCALCIFEROL) 1.25 MG (22337 UT) CAPS capsule       atorvastatin (LIPITOR) 40 MG tablet       ELIQUIS 5 MG TABS tablet       amLODIPine (NORVASC) 10 MG tablet       oxyCODONE (ROXICODONE) 5 MG immediate release tablet       valsartan (DIOVAN) 40 MG tablet TAKE 1 TABLET BY MOUTH TWICE DAILY  3    predniSONE (DELTASONE) 10 MG tablet Take 15 mg by mouth      naloxone 4 MG/0.1ML LIQD nasal spray 1 spray by Nasal route as needed for Opioid Reversal 1 each 5    HYSINGLA ER 30 MG extended release tablet Take 30 mg by mouth. .       No current facility-administered medications on file prior to visit. Review of Systems  A 14 point review of systems was completed by the patient on 1/13/2022 and is available in the media section of the scanned medical record and was reviewed on 1/13/2022.   The review is negative with the exception of those things mentioned in the HPI and Past Medical History    Vital Signs: There were no vitals filed for this visit. General Exam:   Constitutional: Patient is adequately groomed with no evidence of malnutrition      Left shoulder Examination:    Inspection:  No skin lesions, no obvious deformity, no swelling. Palpation:  Tenderness over bicipital groove, rotator cuff insertion, Non-tender to palpate the [facility] joint    Active Range of Motion: Forward Elevation 120, Abduction 100, External Rotation 25, Internal Rotation thigh. Painful arc of motion    Passive Range of Motion: Forward Elevation 160, Abduction 150, External Rotation 30, Internal Rotation thigh with pain    Strength:  Pain inhibition. External Rotation 4/5, Internal Rotation 4/5, Champagne Toast 4/5, Supraspinatus 3 negative+/5, no ER lag. Special Tests: Negative Mary's, negative Hawkin's, No Niranjan deformity. Neurovascular: Palpable radial pulse, normal sensation in the median, ulnar, radial nerve distributions      Comparison right shoulder Examination:    Inspection:  No skin lesions, no deformity, no swelling. Palpation: No tenderness to palpation    Active Range of Motion: Forward Elevation 150, Abduction 170, External Rotation 35, Internal Rotation L4    Passive Range of Motion: Forward Elevation 160, Abduction 170, External Rotation 35, Internal Rotation L4    Strength:  External Rotation 5/5, Internal Rotation 5/5, Champagne Toast 5/5, Supraspinatus 4+/5    Special Tests:  No Niranjan Deformity    Neurovascular:  Palpable radial pulse, normal sensation in the median, ulnar, radial nerve distributions      Self assessment questionnaires were completed today. Radiology:     Plain radiographs of the left shoulder shoulder, comprising 3 views: AP, Scapular Y and Axillary lateral were  obtained and reviewed in the office:    Impression: No fracture or dislocation. No advanced osteoarthritis. Assessment :  Marvin Lopez is a pleasant 78 y.o. male with pain related to rotator cuff tendinitis/tear and biceps tendinitis. Impression:  Encounter Diagnoses   Name Primary?  Left shoulder pain, unspecified chronicity Yes    Tendinitis of left rotator cuff        Office Procedures:  Orders Placed This Encounter   Procedures    XR SHOULDER LEFT (MIN 2 VIEWS)     Standing Status:   Future     Number of Occurrences:   1     Standing Expiration Date:   1/12/2023   Melvin Wilks PT [facility] Physical Therapy     Referral Priority:   Routine     Referral Type:   Eval and Treat     Referral Reason:   Specialty Services Required     Requested Specialty:   Physical Therapy     Number of Visits Requested:   1    LA ARTHROCENTESIS ASPIR&/INJ MAJOR JT/BURSA W/O US       Treatment Plan:    We discussed with Marvin Lopez the diagnosis of rotator cuff tendinitis/tear and biceps tendinitis and shoulder stiffness. At this time we recommend going forward with a steroid injection in the left shoulder and supervised formal physical therapy for 4 weeks. Then we will see him back for another reassessment, if no improvement then we will proceed with MRI. After discussing the risks and benefits of a corticosteroid injection, Shaun Cortez did state an understanding and gave verbal consent to proceed. After cleansing the injection site with Chlora-prep and using aseptic techniques,  2 CC of Depo Medrol 40mg/ml and 8 CC of 1% lidocaine were injected in the left shoulder. He  tolerated the procedure well with no immediate adverse sequelae after the injection. A bandage was placed over the injection site. Appropriate post injections instructions were given to the patient. Marvin Lopez will follow up in 4 weeks. He is in agreement with this plan and all questions were answered to the patient's satisfaction. He was encouraged to call with any questions.        Anders Toth MD  [facility] Clinical fellow    2:02 PM  1/13/2022    During this examination, I, Rosario Gomes MD, functioned as a scribe for Dr. Maldonado Schneider. The history taking and physical examination were performed by Dr. Edilberto Greenberg. All counseling during the appointment was performed between the patient and Dr. Edilberto Greenberg.   ______________________  I was physically present and personally supervised the Orthopaedic Sports Medicine Fellow in the evaluation and development of a treatment plan for this patient. I personally interviewed the patient and performed a physical examination. In addition, I discussed the patient's condition and treatment options with them. I have also reviewed and agree with the past medical, family and social history unless otherwise noted. All of the patient's questions were answered. Murtaza Greenberg MD, PhD  1/13/2022

## 2022-01-13 NOTE — LETTER
Shoulder Elbow Rehabilitation Referral    Patient Name: Macarena Persaud      YOB: 1955    Diagnosis:   1. Left shoulder pain, unspecified chronicity    2. Tendinitis of left rotator cuff        Precautions:Stiffness and rotator cuff tendinitis    Post Op Instructions:  [] Continuous passive motion (CPM)  [] Elbow range of motion  [] Exercise in plane of scapula   []  Strengthening     [] Pulley and instruction    [x] Home exercise program (copy to patient)   [] Sling when arm at risk  [] Sling or brace at all times   [x] AAROM: Forward elevation to 140            [x] AAROM: External rotation to 40    [] Isometric external rotator strengthening [x] AAROM: internal rotation: up the back  [] Isometric abductor strengthening  [x] AAROM: Internal abduction     [] Isometric internal rotator strengthening [] AAROM: cross-body adduction             Stretching:     Strengthening:  [x] Four quadrant (FE, ER, IR, CBA)  [x] Rotator cuff (ER, IR, Abd)  [x] Forward Elevation    [] External Rotators     [x] External Rotation    [] Internal Rotators  [x] Internal Rotation: up/back   [] Abductors     [x] Internal Rotation: supine in abduction  [] Flexors  [] Cross-body abduction    [] Extensors  [] Pendulum (FE, Abd/Add, cw/ccw)  [x] Scapular Stabilizers   [] Wall-walking (FE, Abd)    [x] Shoulder shrugs     [] Table slides      [x] Rhomboid pinch  [] Elbow (flex, ext, pron, sup)    [] Lat.  Pull downs     [] Medial epicondylitis program    [] Forward punch   [] Lateral epicondylitis program    [] Internal rotators     [] Progressive resistive exercises  [] Bench Press        [] Bench press plus  Activities:     [] Lateral pull-downs  [] Rowing     [] Progressive two-hand supine press  [] Stepper/Exercise bike   [] Biceps: curls/supination  [] Swimming  [] Water exercises    Modalities: PRN    Return to Sport:  [] Ultrasound     [] Plyometrics  [] Iontophoresis     [] Rhythmic stabilization  [] Moist heat     [] Core strengthening   [] Massage     [] Sports specific program:   [x] Cryotherapy      [] Electrical stimulation     [] Paraffin  [] Whirlpool  [] TENS    [x] Home exercise program (copy to patient). Perform exercises for:   15     minutes    2-3      times/day  [x] Supervised physical therapy  Frequency: []  1x week  [x] 2x week  [] 3x week  [] Other:   Duration: [] 2 weeks   [] 4 weeks  [x] 6 weeks  [] Other:     Additional Instructions:         Murtaza Moore MD, PhD

## 2022-01-19 ENCOUNTER — EVALUATION (OUTPATIENT)
Dept: PHYSICAL THERAPY | Age: 67
End: 2022-01-19
Payer: MEDICARE

## 2022-01-19 DIAGNOSIS — M75.82 TENDINITIS OF LEFT ROTATOR CUFF: Primary | ICD-10-CM

## 2022-01-19 DIAGNOSIS — R29.898 WEAKNESS OF EXTREMITY: ICD-10-CM

## 2022-01-19 DIAGNOSIS — M25.512 LEFT SHOULDER PAIN, UNSPECIFIED CHRONICITY: ICD-10-CM

## 2022-01-19 PROCEDURE — 97530 THERAPEUTIC ACTIVITIES: CPT | Performed by: PHYSICAL THERAPIST

## 2022-01-19 PROCEDURE — 97162 PT EVAL MOD COMPLEX 30 MIN: CPT | Performed by: PHYSICAL THERAPIST

## 2022-01-19 PROCEDURE — 97110 THERAPEUTIC EXERCISES: CPT | Performed by: PHYSICAL THERAPIST

## 2022-01-19 PROCEDURE — 97140 MANUAL THERAPY 1/> REGIONS: CPT | Performed by: PHYSICAL THERAPIST

## 2022-01-19 NOTE — PROGRESS NOTES
[facility]   Phone: [phone]    Fax: [phone]      Physical Therapy Treatment Note/ Progress Report:         Date:  2022    Patient Name:  Princess Mills    :  1955  MRN: [id number]  Restrictions/Precautions:  HBP, OA, Stroke/TIA  Medical/Treatment Diagnosis Information:  · Diagnosis: L shoulder pain, L RTC tendinitis  ·  Treating Diagnosis: M75.82 L RTC tendinitis; M25.512 L shoulder pain, H18.001,    Insurance/Certification information:  PT Insurance Information: [employer]  Physician Information:  Referring Practitioner: Dr. Serina Diego  Has the plan of care been signed (Y/N):        []  Yes  [x]  No     Date of Patient follow up with Physician: 22      Is this a Progress Report:     []  Yes  [x]  No        If Yes:  Date Range for reporting period:  Beginning 22  Ending 22    Progress report will be due (10 Rx or 30 days whichever is less):        Recertification will be due (POC Duration  / 90 days whichever is less): 22        Visit # Insurance Allowable Auth Required   1 BMN []  Yes []  No        Functional Scale: UEFI 51/80=63%   Date assessed:  22      Latex Allergy:  [x]NO      []YES  Preferred Language for Healthcare:   [x]English       []other:    Pain level:  2-8/10     SUBJECTIVE:  See eval    OBJECTIVE: See eval   Observation:    Test measurements:      RESTRICTIONS/PRECAUTIONS: OA, HTN, stroke/TIA; R massive RTC repair in 2019    Exercises/Interventions:   Exercises:  Exercise/Equipment Resistance/Repetitions Comments   Cardio/Warm-up     Bike          Stretching/PROM     Wand ER @0 10\"x10    Counter stretch 10\"x10    Table Slides     UE Success     Pulleys     Pendulum           STRENGTH     Isometrics     Retraction x10 cuing        Weight shift     Flexion     Abduction 10\"x10 Below pain threshold   External Rotation     Internal Rotation     Biceps     Triceps          PRE's     Flexion     Abduction External Rotation     Internal Rotation     Shrugs     EXT     Reverse Flys     Serratus     Horizontal Abd with ER     Biceps     Triceps     Retraction          Cable Column/Theraband     External Rotation     Internal Rotation     Shrugs     Lats     Ext     Flex     Scapular Retraction     BIC     TRIC     PNF          Neuromuscular Re-ed     Dynamic Stability                              Ed given on diagnosis, expectations & goals x10'                   Manual/Modalities                       Therapeutic Exercise and NMR EXR  [] (79257) Provided verbal/tactile cueing for activities related to strengthening, flexibility, endurance, ROM  for improvements in scapular, scapulothoracic and UE control with self care, reaching, carrying, lifting, house/yardwork, driving/computer work.    [] (67610) Provided verbal/tactile cueing for activities related to improving balance, coordination, kinesthetic sense, posture, motor skill, proprioception  to assist with  scapular, scapulothoracic and UE control with self care, reaching, carrying, lifting, house/yardwork, driving/computer work. Therapeutic Activities:    [] (23568 or 98823) Provided verbal/tactile cueing for activities related to improving balance, coordination, kinesthetic sense, posture, motor skill, proprioception and motor activation to allow for proper function of scapular, scapulothoracic and UE control with self care, carrying, lifting, driving/computer work. Home Exercise Program:    [x] (37655) Reviewed/Progressed HEP activities related to strengthening, flexibility, endurance, ROM of scapular, scapulothoracic and UE control with self care, reaching, carrying, lifting, house/yardwork, driving/computer work     Written HEP est    Access Code: [email or web address]  URL: [email or web address]  Date: 01/19/2022  Prepared by: Hiral Esposito    Exercises  Standing 'L' Stretch at Counter - 1 x daily - 7 x weekly - 3 sets - 10 reps - 10 hold  Supine Shoulder External Rotation in 45 Degrees Abduction AAROM with Dowel - 1 x daily - 7 x weekly - 3 sets - 10 reps - 10 hold  Circular Shoulder Pendulum with Table Support - 1 x daily - 7 x weekly - 3 sets - 1 reps - 3 min hold  Seated Scapular Retraction - 1 x daily - 7 x weekly - 3 sets - 10 reps  Isometric Shoulder Abduction at Wall - 1 x daily - 7 x weekly - 3 sets - 10 reps - 10 hold      [] (14801) Reviewed/Progressed HEP activities related to improving balance, coordination, kinesthetic sense, posture, motor skill, proprioception of scapular, scapulothoracic and UE control with self care, reaching, carrying, lifting, house/yardwork, driving/computer work      Manual Treatments:  PROM / STM / Oscillations-Mobs:  G-I, II, III, IV (PA's, Inf., Post.)  [] ([id number]) Provided manual therapy to mobilize soft tissue/joints of cervical/CT, scapular GHJ and UE for the purpose of modulating pain, promoting relaxation,  increasing ROM, reducing/eliminating soft tissue swelling/inflammation/restriction, improving soft tissue extensibility and allowing for proper ROM for normal function with self care, reaching, carrying, lifting, house/yardwork, driving/computer work    Modalities: CP x10 min L shoulder    [] GAME READY (VASO)- for significant edema, swelling, pain control. 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 Independent in HEP and progression per patient tolerance, in order to prevent re-injury. [] Progressing: [] Met: [] Not Met: [] Adjusted  2. Patient will have a decrease in pain to facilitate improvement in movement, function, and ADLs as indicated by Functional Deficits. [] Progressing: [] Met: [] Not Met: [] Adjusted    Long Term Goals: To be achieved in: 8 weeks  1. Disability index score of 20% or less for the DASH to assist with reaching prior level of function. [] Progressing: [] Met: [] Not Met: [] Adjusted  2. Patient will demonstrate increased AROM to 160 deg to allow for proper joint functioning as indicated by patients Functional Deficits. [] Progressing: [] Met: [] Not Met: [] Adjusted  3. Patient will demonstrate an increase in Strength to 4+/5 to allow for proper functional mobility as indicated by patients Functional Deficits. [] Progressing: [] Met: [] Not Met: [] Adjusted  4. Patient will return to 90% functional activities without increased symptoms or restriction. [] Progressing: [] Met: [] Not Met: [] Adjusted  5. Pt will be able to lift L shoulder to put milk in refrigerator and to perform work on car in all direction w/ reaching. (patient specific functional goal)    [] Progressing: [] Met: [] Not Met: [] Adjusted     Overall Progression Towards Functional goals/ Treatment Progress Update:  [] Patient is progressing as expected towards functional goals listed. [] Progression is slowed due to complexities/Impairments listed. [] Progression has been slowed due to co-morbidities.   [x] Plan just implemented, too soon to assess goals progression <30days   [] Goals require adjustment due to lack of progress  [] Patient is not progressing as expected and requires additional follow up with physician  [] Other    ASSESSMENT:  See eval    Treatment/Activity Tolerance:  [] Patient tolerated treatment well [] Patient limited by jim  [x] Patient limited by

## 2022-01-19 NOTE — PROGRESS NOTES
[facility]   Phone: [phone]    Fax: [phone]        Physical Therapy Certification      Dear Referring Practitioner: Dr. Kelly Olson,    We had the pleasure of evaluating the following patient for physical therapy services at [address] [place] [address]. A summary of our findings can be found in the initial assessment below. This includes our plan of care. If you have any questions or concerns regarding these findings, please do not hesitate to contact me at the office phone number checked above. Thank you for the referral.       Physician Signature:_______________________________Date:__________________  By signing above (or electronic signature), therapists plan is approved by physician      Patient: Teja Villatoro   : 1955   MRN: [id number]  Referring Physician: Referring Practitioner: Dr. Kelly Olson      Evaluation Date: 2022      Medical Diagnosis Information:  Diagnosis: L shoulder pain, L RTC tendinitis                                             Insurance information: PT Insurance Information: [employer]    Precautions/ Contra-indications: Stroke/TIA, HBP controlled; Latex Allergy:  [x]NO      []YES  Preferred Language for Healthcare:   [x]English       []Other:    C-SSRS Triggered by Intake questionnaire (Past 2 wk assessment):   [x] No, Questionnaire did not trigger screening.   [] Yes, Patient intake triggered further evaluation      [] C-SSRS Screening completed  [] PCP notified via Plan of Care  [] Emergency services notified     SUBJECTIVE: Patient stated complaint: L shoulder pain. Pt has been having L shoulder pain x3-4 months w/o known injury. Pt is now waking up at night due to pain in shoulder. Injection given on 22 to L shoulder that did not change symptoms. Pain is located in entire L shoulder radiating from into arm. Denies any neck symptoms. RHD. Pt lives w/ others to assist as needed.     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 170 130! 160    Abduction 95! 170 90! 162    ER ~75! 90 Head! T1    IR ~25! 40 Hip! L1    Other        Other             Strength L R Comment   Flexion 3! 5 1/19/2022   Abduction 3! 5    ER 3! 5    IR 4- 5    Supraspinatus      Upper Trap      Lower Trap      Mid Trap      Rhomboids      Biceps 4+ 5    Triceps 4! 5    Horizontal Abduction      Horizontal Adduction      Lats          Special Tests Results/Comment   Garcia-Hany    Neers    Speeds neg   OBriens    Other    Neurologic Signs None noted   Functional Reach       Reflexes/Sensation:    [x]Dermatomes/Myotomes intact    [x]Reflexes equal and normal bilaterally   []Other:    Joint mobility:    []Normal    [x]Hypo L shoulder   []Hyper    Palpation: TTP in supraspinatus, subscapularis, and infraspinatus muscle bellies w/ tightness noted; Pt was very tender in pec and along ribs w/ mod tightness noted; Functional Mobility/Transfers: indep w/o deviations; Posture: protracted bilat w/ FHP and rounded shoulders;    Bandages/Dressings/Incisions: n/a;    Gait: (include devices/WB status): WNL but dec arm swing and trunk rotation;     Orthopedic Special Tests: see above                       [x] Patient history, allergies, meds reviewed.  Medical chart reviewed. See intake form. Review Of Systems (ROS):  [x]Performed Review of systems (Integumentary, CardioPulmonary, Neurological) by intake and observation. Intake form has been scanned into medical record. Patient has been instructed to contact their primary care physician regarding ROS issues if not already being addressed at this time. Co-morbidities/Complexities (which will affect course of rehabilitation):   []None           Arthritic conditions   [x]Rheumatoid arthritis (M05.9)  [x]Osteoarthritis (M19.91)   Cardiovascular conditions   [x]Hypertension (I10)  []Hyperlipidemia (E78.5)  []Angina pectoris (I20)  []Atherosclerosis (I70)  Stroke/TIA Musculoskeletal conditions   []Disc pathology   []Congenital spine pathologies   []Prior surgical intervention  []Osteoporosis (M81.8)  []Osteopenia (M85.8)   Endocrine conditions   []Hypothyroid (E03.9)  []Hyperthyroid Gastrointestinal conditions   []Constipation (K98.80)   Metabolic conditions   []Morbid obesity (E66.01)  []Diabetes type 1(E10.65) or 2 (E11.65)   []Neuropathy (G60.9)     Pulmonary conditions   []Asthma (J45)  []Coughing   []COPD (J44.9)   Psychological Disorders  []Anxiety (F41.9)  []Depression (F32.9)   []Other:   []Other:          Barriers to/and or personal factors that will affect rehab potential:              []Age  []Sex              [x]Motivation/Lack of Motivation                        []Co-Morbidities              []Cognitive Function, education/learning barriers              []Environmental, home barriers              []profession/work barriers  [x]past PT/medical experience  []other:  Justification:     Falls Risk Assessment (30 days):   [x] Falls Risk assessed and no intervention required. [] Falls Risk assessed and Patient requires intervention due to being higher risk   TUG score (>12s at risk):     [] Falls education provided, including         ASSESSMENT: Pt is 79 y/o male w/ L shoulder pain.  Pt has limited motion, dec strength in infraspinatus and supraspinatus w/ pain, postural deviations and dec function using L UE. Pt has mod tenderness w/ palpation in pec and along lat chest and GH jt line. Pt should progress in motion and tightness in muscles to see if that improves pain w/ therapy. Functional Impairments   [x]Noted spinal or UE joint hypomobility   []Noted spinal or UE joint hypermobility   [x]Decreased UE functional ROM   [x]Decreased UE functional strength   [x]Abnormal reflexes/sensation/myotomal/dermatomal deficits   [x]Decreased RC/scapular/core strength and neuromuscular control   []other:      Functional Activity Limitations (from functional questionnaire and intake)   [x]Reduced ability to tolerate prolonged functional positions   []Reduced ability or difficulty with changes of positions or transfers between positions   []Reduced ability to maintain good posture and demonstrate good body mechanics with sitting, bending, and lifting   [] Reduced ability or tolerance with driving and/or computer work   []Reduced ability to sleep   [x]Reduced ability to perform lifting, reaching, carrying tasks   [x]Reduced ability to tolerate impact through UE   [x]Reduced ability to reach behind back   [x]Reduced ability to  or hold objects   [x]Reduced ability to throw or toss an object   []other:    Participation Restrictions   [x]Reduced participation in self care activities   [x]Reduced participation in home management activities   []Reduced participation in work activities   [x]Reduced participation in social activities. [x]Reduced participation in sport/recreation activities. Classification:   []Signs/symptoms consistent with post-surgical status including decreased ROM, strength and function.   []Signs/symptoms consistent with joint sprain/strain   [x]Signs/symptoms consistent with shoulder impingement   []Signs/symptoms consistent with shoulder/elbow/wrist tendinopathy   [x]Signs/symptoms consistent with Rotator cuff tear   []Signs/symptoms consistent with labral tear   [x]Signs/symptoms consistent with postural dysfunction    []Signs/symptoms consistent with Glenohumeral IR Deficit - <45 degrees   []Signs/symptoms consistent with facet dysfunction of cervical/thoracic spine    []Signs/symptoms consistent with pathology which may benefit from Dry needling     []other:     Prognosis/Rehab Potential:      []Excellent   [x]Good    [x]Fair   []Poor    Tolerance of evaluation/treatment:    []Excellent   []Good    [x]Fair   []Poor    Physical Therapy Evaluation Complexity Justification  [x] A history of present problem with:  [] no personal factors and/or comorbidities that impact the plan of care;  []1-2 personal factors and/or comorbidities that impact the plan of care  [x]3 personal factors and/or comorbidities that impact the plan of care  [x] An examination of body systems using standardized tests and measures addressing any of the following: body structures and functions (impairments), activity limitations, and/or participation restrictions;:  [] a total of 1-2 or more elements   [] a total of 3 or more elements   [x] a total of 4 or more elements   [x] A clinical presentation with:  [] stable and/or uncomplicated characteristics   [x] evolving clinical presentation with changing characteristics  [] unstable and unpredictable characteristics;   [x] Clinical decision making of [] low, [x] moderate, [] high complexity using standardized patient assessment instrument and/or measurable assessment of functional outcome.     [] EVAL (LOW) 90635 (typically 20 minutes face-to-face)  [x] EVAL (MOD) 58253 (typically 30 minutes face-to-face)  [] EVAL (HIGH) 01247 (typically 45 minutes face-to-face)  [] RE-EVAL     PLAN:  Frequency/Duration:  1 days per week for 6 Weeks:  INTERVENTIONS:  [x] Therapeutic exercise including: strength training, ROM, for Upper extremity and core   [x]  NMR activation and proprioception for UE, scap and Core [x] Manual therapy as indicated for shoulder, scapula and spine to include: Dry Needling/IASTM, STM, PROM, Gr I-IV mobilizations, manipulation. [x] Modalities as needed that may include: thermal agents, E-stim, Biofeedback, US, iontophoresis as indicated  [x] Patient education on joint protection, postural re-education, activity modification, progression of HEP. HEP instruction: Instructed patient in a HEP. Patient demonstrated exercises correctly. Handout with  pictures and # of reps/sets was given to pt. Exercises included those listed above. PT's business card with information given to pt for any questions or problems that may occur before next visit. 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 Independent in HEP and progression per patient tolerance, in order to prevent re-injury. [] Progressing: [] Met: [] Not Met: [] Adjusted  2. Patient will have a decrease in pain to facilitate improvement in movement, function, and ADLs as indicated by Functional Deficits. [] Progressing: [] Met: [] Not Met: [] Adjusted    Long Term Goals: To be achieved in: 8 weeks  1. Disability index score of 20% or less for the DASH to assist with reaching prior level of function. [] Progressing: [] Met: [] Not Met: [] Adjusted  2. Patient will demonstrate increased AROM to 160 deg to allow for proper joint functioning as indicated by patients Functional Deficits. [] Progressing: [] Met: [] Not Met: [] Adjusted  3. Patient will demonstrate an increase in Strength to 4+/5 to allow for proper functional mobility as indicated by patients Functional Deficits. [] Progressing: [] Met: [] Not Met: [] Adjusted  4. Patient will return to 90% functional activities without increased symptoms or restriction. [] Progressing: [] Met: [] Not Met: [] Adjusted  5.  Pt will be able to lift L shoulder to put milk in refrigerator and to perform work on car in all direction w/ reaching. (patient specific functional goal)    [] Progressing: [] Met: [] Not Met: [] Adjusted     Electronically signed by:  Renee France PT, Susan Sanford 86, OMT-C    Physical Therapist [place] license #[id number]  Physical Therapist [place] license #[id number]

## 2022-01-28 ENCOUNTER — TREATMENT (OUTPATIENT)
Dept: PHYSICAL THERAPY | Age: 67
End: 2022-01-28

## 2022-01-28 DIAGNOSIS — R29.898 WEAKNESS OF EXTREMITY: ICD-10-CM

## 2022-01-28 DIAGNOSIS — M75.82 TENDINITIS OF LEFT ROTATOR CUFF: Primary | ICD-10-CM

## 2022-01-28 DIAGNOSIS — M25.512 LEFT SHOULDER PAIN, UNSPECIFIED CHRONICITY: ICD-10-CM

## 2022-01-28 NOTE — PROGRESS NOTES
[facility]   Phone: [phone]    Fax: [phone]            Physical Therapy  Cancellation/No-show Note  Patient Name:  Bucky Urbina  :  1955   Date:  2022  Cancelled visits to date: 0  No-shows to date: 1    For today's appointment patient:  []  Cancelled  []  Rescheduled appointment  [x]  No-show     Reason given by patient:  []  Patient ill  []  Conflicting appointment  []  No transportation    []  Conflict with work  []  No reason given  []  Other:     Comments: Pt has no other appts scheduled for PT so this will serve as discharge note, if he does not call or return to clinic. Pt does have follow up appt on 22 w/ MD.     Phone call information:   [x]  Phone call made today to patient at 2:00 time at number provided:      []  Patient answered, conversation as follows:    [x]  Patient did not answer, pt's phone was full and unable to leave a message:  []  Phone call not made today  []  Phone call not needed - pt contacted us to cancel and provided reason for cancellation.      Electronically signed by:  Estelita Banks PT, Susan Sanford 86, OMT-C    Physical Therapist [address] [place] [address] license #[id number]  Physical Therapist [place] license #[id number]